# Patient Record
Sex: MALE | Race: BLACK OR AFRICAN AMERICAN | NOT HISPANIC OR LATINO | Employment: FULL TIME | URBAN - METROPOLITAN AREA
[De-identification: names, ages, dates, MRNs, and addresses within clinical notes are randomized per-mention and may not be internally consistent; named-entity substitution may affect disease eponyms.]

---

## 2017-01-10 ENCOUNTER — TREATMENT (OUTPATIENT)
Dept: PHYSICAL THERAPY | Facility: CLINIC | Age: 52
End: 2017-01-10

## 2017-01-10 PROCEDURE — 97750 PHYSICAL PERFORMANCE TEST: CPT | Performed by: PHYSICAL THERAPIST

## 2017-03-16 ENCOUNTER — APPOINTMENT (OUTPATIENT)
Dept: PREADMISSION TESTING | Facility: HOSPITAL | Age: 52
End: 2017-03-16

## 2017-03-16 VITALS — WEIGHT: 247.58 LBS | HEIGHT: 71 IN | BODY MASS INDEX: 34.66 KG/M2

## 2017-03-16 LAB
DEPRECATED RDW RBC AUTO: 39.8 FL (ref 37–54)
ERYTHROCYTE [DISTWIDTH] IN BLOOD BY AUTOMATED COUNT: 13.9 % (ref 11.3–14.5)
HBA1C MFR BLD: 6.5 % (ref 4.8–5.6)
HCT VFR BLD AUTO: 42.9 % (ref 38.9–50.9)
HGB BLD-MCNC: 14.3 G/DL (ref 13.1–17.5)
MCH RBC QN AUTO: 26.4 PG (ref 27–31)
MCHC RBC AUTO-ENTMCNC: 33.3 G/DL (ref 32–36)
MCV RBC AUTO: 79.2 FL (ref 80–99)
PLATELET # BLD AUTO: 287 10*3/MM3 (ref 150–450)
PMV BLD AUTO: 9.1 FL (ref 6–12)
POTASSIUM BLD-SCNC: 4.2 MMOL/L (ref 3.5–5.5)
RBC # BLD AUTO: 5.42 10*6/MM3 (ref 4.2–5.76)
WBC NRBC COR # BLD: 7.82 10*3/MM3 (ref 3.5–10.8)

## 2017-03-16 PROCEDURE — 85027 COMPLETE CBC AUTOMATED: CPT | Performed by: ANESTHESIOLOGY

## 2017-03-16 PROCEDURE — 93005 ELECTROCARDIOGRAM TRACING: CPT

## 2017-03-16 PROCEDURE — 93010 ELECTROCARDIOGRAM REPORT: CPT | Performed by: INTERNAL MEDICINE

## 2017-03-16 PROCEDURE — 84132 ASSAY OF SERUM POTASSIUM: CPT | Performed by: ANESTHESIOLOGY

## 2017-03-16 PROCEDURE — 83036 HEMOGLOBIN GLYCOSYLATED A1C: CPT | Performed by: ANESTHESIOLOGY

## 2017-03-16 PROCEDURE — 36415 COLL VENOUS BLD VENIPUNCTURE: CPT

## 2017-03-16 NOTE — PAT
MEASURED FOR TEDS/SCDS IN PAT    CALF MEASUREMENT   17.5   LENGTH MEASUREMENT 18    Patient to apply to surgical area (as instructed) the night before procedure and the AM of procedure.

## 2017-03-20 ENCOUNTER — ANESTHESIA EVENT (OUTPATIENT)
Dept: PERIOP | Facility: HOSPITAL | Age: 52
End: 2017-03-20

## 2017-03-20 RX ORDER — FAMOTIDINE 10 MG/ML
20 INJECTION, SOLUTION INTRAVENOUS ONCE
Status: CANCELLED | OUTPATIENT
Start: 2017-03-20 | End: 2017-03-20

## 2017-03-21 ENCOUNTER — ANESTHESIA (OUTPATIENT)
Dept: PERIOP | Facility: HOSPITAL | Age: 52
End: 2017-03-21

## 2017-03-21 ENCOUNTER — APPOINTMENT (OUTPATIENT)
Dept: GENERAL RADIOLOGY | Facility: HOSPITAL | Age: 52
End: 2017-03-21

## 2017-03-21 ENCOUNTER — HOSPITAL ENCOUNTER (INPATIENT)
Facility: HOSPITAL | Age: 52
LOS: 1 days | Discharge: HOME | End: 2017-03-22
Attending: ORTHOPAEDIC SURGERY | Admitting: ORTHOPAEDIC SURGERY

## 2017-03-21 DIAGNOSIS — M50.90 CERVICAL DISC DISORDER: ICD-10-CM

## 2017-03-21 PROBLEM — M54.2 NECK PAIN: Status: ACTIVE | Noted: 2017-03-21

## 2017-03-21 PROBLEM — E11.9 DIABETES MELLITUS: Status: ACTIVE | Noted: 2017-03-21

## 2017-03-21 PROBLEM — Z98.1 S/P CERVICAL SPINAL FUSION: Status: ACTIVE | Noted: 2017-03-21

## 2017-03-21 PROBLEM — I10 HTN (HYPERTENSION): Status: ACTIVE | Noted: 2017-03-21

## 2017-03-21 LAB
GLUCOSE BLDC GLUCOMTR-MCNC: 170 MG/DL (ref 70–130)
GLUCOSE BLDC GLUCOMTR-MCNC: 175 MG/DL (ref 70–130)

## 2017-03-21 PROCEDURE — 25010000002 HYDROMORPHONE PER 4 MG: Performed by: ANESTHESIOLOGY

## 2017-03-21 PROCEDURE — 82962 GLUCOSE BLOOD TEST: CPT

## 2017-03-21 PROCEDURE — C1713 ANCHOR/SCREW BN/BN,TIS/BN: HCPCS | Performed by: ORTHOPAEDIC SURGERY

## 2017-03-21 PROCEDURE — 88304 TISSUE EXAM BY PATHOLOGIST: CPT | Performed by: ORTHOPAEDIC SURGERY

## 2017-03-21 PROCEDURE — 25010000002 DEXAMETHASONE PER 1 MG: Performed by: NURSE ANESTHETIST, CERTIFIED REGISTERED

## 2017-03-21 PROCEDURE — 63710000001 INSULIN LISPRO (HUMAN) PER 5 UNITS: Performed by: NURSE PRACTITIONER

## 2017-03-21 PROCEDURE — 25010000002 ONDANSETRON PER 1 MG: Performed by: NURSE ANESTHETIST, CERTIFIED REGISTERED

## 2017-03-21 PROCEDURE — 76000 FLUOROSCOPY <1 HR PHYS/QHP: CPT

## 2017-03-21 PROCEDURE — 25010000002 FENTANYL CITRATE (PF) 100 MCG/2ML SOLUTION: Performed by: NURSE ANESTHETIST, CERTIFIED REGISTERED

## 2017-03-21 PROCEDURE — 76001 HC FLUORO OVER 1 HOUR: CPT

## 2017-03-21 PROCEDURE — 88311 DECALCIFY TISSUE: CPT | Performed by: ORTHOPAEDIC SURGERY

## 2017-03-21 PROCEDURE — 25010000003 CEFAZOLIN IN DEXTROSE 2-4 GM/100ML-% SOLUTION

## 2017-03-21 PROCEDURE — 25010000002 HYDROMORPHONE PER 4 MG: Performed by: ORTHOPAEDIC SURGERY

## 2017-03-21 PROCEDURE — 25010000002 MORPHINE PER 10 MG: Performed by: ANESTHESIOLOGY

## 2017-03-21 PROCEDURE — 0RG20A0 FUSION OF 2 OR MORE CERVICAL VERTEBRAL JOINTS WITH INTERBODY FUSION DEVICE, ANTERIOR APPROACH, ANTERIOR COLUMN, OPEN APPROACH: ICD-10-PCS | Performed by: ORTHOPAEDIC SURGERY

## 2017-03-21 PROCEDURE — L0120 CERV FLEX N/ADJ FOAM PRE OTS: HCPCS | Performed by: ORTHOPAEDIC SURGERY

## 2017-03-21 PROCEDURE — 25010000003 CEFAZOLIN IN DEXTROSE 2-4 GM/100ML-% SOLUTION: Performed by: ORTHOPAEDIC SURGERY

## 2017-03-21 PROCEDURE — 25010000002 PROPOFOL 10 MG/ML EMULSION: Performed by: NURSE ANESTHETIST, CERTIFIED REGISTERED

## 2017-03-21 PROCEDURE — 25010000002 PHENYLEPHRINE PER 1 ML: Performed by: NURSE ANESTHETIST, CERTIFIED REGISTERED

## 2017-03-21 PROCEDURE — 0RT30ZZ RESECTION OF CERVICAL VERTEBRAL DISC, OPEN APPROACH: ICD-10-PCS | Performed by: ORTHOPAEDIC SURGERY

## 2017-03-21 DEVICE — SCRW SKYLINE VAR SD 16MM: Type: IMPLANTABLE DEVICE | Site: SPINE CERVICAL | Status: FUNCTIONAL

## 2017-03-21 DEVICE — BENGAL SYSTEM LARGE IMPLANT 6MM, 7 DEGREES
Type: IMPLANTABLE DEVICE | Site: SPINE CERVICAL | Status: FUNCTIONAL
Brand: BENGAL

## 2017-03-21 DEVICE — ALLOGRFT BONE VIVIGEN CELLUAR MATRX FZ 1CC: Type: IMPLANTABLE DEVICE | Site: SPINE CERVICAL | Status: FUNCTIONAL

## 2017-03-21 DEVICE — PLT SKYLINE 2LVL 36MM: Type: IMPLANTABLE DEVICE | Site: SPINE CERVICAL | Status: FUNCTIONAL

## 2017-03-21 RX ORDER — FENTANYL CITRATE 50 UG/ML
50 INJECTION, SOLUTION INTRAMUSCULAR; INTRAVENOUS
Status: DISCONTINUED | OUTPATIENT
Start: 2017-03-21 | End: 2017-03-21 | Stop reason: HOSPADM

## 2017-03-21 RX ORDER — ACETAMINOPHEN 325 MG/1
650 TABLET ORAL EVERY 4 HOURS PRN
Status: DISCONTINUED | OUTPATIENT
Start: 2017-03-21 | End: 2017-03-22 | Stop reason: HOSPADM

## 2017-03-21 RX ORDER — SODIUM CHLORIDE 0.9 % (FLUSH) 0.9 %
1-10 SYRINGE (ML) INJECTION AS NEEDED
Status: DISCONTINUED | OUTPATIENT
Start: 2017-03-21 | End: 2017-03-21 | Stop reason: HOSPADM

## 2017-03-21 RX ORDER — HYDROMORPHONE HYDROCHLORIDE 1 MG/ML
0.5 INJECTION, SOLUTION INTRAMUSCULAR; INTRAVENOUS; SUBCUTANEOUS
Status: DISCONTINUED | OUTPATIENT
Start: 2017-03-21 | End: 2017-03-21 | Stop reason: HOSPADM

## 2017-03-21 RX ORDER — MAGNESIUM HYDROXIDE 1200 MG/15ML
LIQUID ORAL AS NEEDED
Status: DISCONTINUED | OUTPATIENT
Start: 2017-03-21 | End: 2017-03-21 | Stop reason: HOSPADM

## 2017-03-21 RX ORDER — LIDOCAINE HYDROCHLORIDE 10 MG/ML
INJECTION, SOLUTION EPIDURAL; INFILTRATION; INTRACAUDAL; PERINEURAL AS NEEDED
Status: DISCONTINUED | OUTPATIENT
Start: 2017-03-21 | End: 2017-03-21 | Stop reason: SURG

## 2017-03-21 RX ORDER — ZOLPIDEM TARTRATE 5 MG/1
5 TABLET ORAL NIGHTLY PRN
Status: DISCONTINUED | OUTPATIENT
Start: 2017-03-21 | End: 2017-03-22 | Stop reason: HOSPADM

## 2017-03-21 RX ORDER — ONDANSETRON 2 MG/ML
4 INJECTION INTRAMUSCULAR; INTRAVENOUS ONCE AS NEEDED
Status: DISCONTINUED | OUTPATIENT
Start: 2017-03-21 | End: 2017-03-21 | Stop reason: HOSPADM

## 2017-03-21 RX ORDER — DEXAMETHASONE SODIUM PHOSPHATE 4 MG/ML
4 INJECTION, SOLUTION INTRA-ARTICULAR; INTRALESIONAL; INTRAMUSCULAR; INTRAVENOUS; SOFT TISSUE EVERY 6 HOURS SCHEDULED
Status: DISCONTINUED | OUTPATIENT
Start: 2017-03-21 | End: 2017-03-22 | Stop reason: HOSPADM

## 2017-03-21 RX ORDER — AMLODIPINE BESYLATE 2.5 MG/1
2.5 TABLET ORAL DAILY
Status: DISCONTINUED | OUTPATIENT
Start: 2017-03-22 | End: 2017-03-22 | Stop reason: HOSPADM

## 2017-03-21 RX ORDER — SODIUM CHLORIDE, SODIUM LACTATE, POTASSIUM CHLORIDE, CALCIUM CHLORIDE 600; 310; 30; 20 MG/100ML; MG/100ML; MG/100ML; MG/100ML
9 INJECTION, SOLUTION INTRAVENOUS CONTINUOUS
Status: DISCONTINUED | OUTPATIENT
Start: 2017-03-21 | End: 2017-03-22 | Stop reason: HOSPADM

## 2017-03-21 RX ORDER — HYDROMORPHONE HCL 110MG/55ML
2 PATIENT CONTROLLED ANALGESIA SYRINGE INTRAVENOUS EVERY 4 HOURS PRN
Status: DISCONTINUED | OUTPATIENT
Start: 2017-03-21 | End: 2017-03-22 | Stop reason: HOSPADM

## 2017-03-21 RX ORDER — FENTANYL CITRATE 50 UG/ML
50 INJECTION, SOLUTION INTRAMUSCULAR; INTRAVENOUS
Status: DISCONTINUED | OUTPATIENT
Start: 2017-03-21 | End: 2017-03-22 | Stop reason: HOSPADM

## 2017-03-21 RX ORDER — DEXAMETHASONE 4 MG/1
4 TABLET ORAL EVERY 6 HOURS SCHEDULED
Status: DISCONTINUED | OUTPATIENT
Start: 2017-03-21 | End: 2017-03-22 | Stop reason: HOSPADM

## 2017-03-21 RX ORDER — ROCURONIUM BROMIDE 10 MG/ML
INJECTION, SOLUTION INTRAVENOUS AS NEEDED
Status: DISCONTINUED | OUTPATIENT
Start: 2017-03-21 | End: 2017-03-21 | Stop reason: SURG

## 2017-03-21 RX ORDER — ATRACURIUM BESYLATE 10 MG/ML
INJECTION, SOLUTION INTRAVENOUS AS NEEDED
Status: DISCONTINUED | OUTPATIENT
Start: 2017-03-21 | End: 2017-03-21 | Stop reason: SURG

## 2017-03-21 RX ORDER — PROPOFOL 10 MG/ML
VIAL (ML) INTRAVENOUS AS NEEDED
Status: DISCONTINUED | OUTPATIENT
Start: 2017-03-21 | End: 2017-03-21 | Stop reason: SURG

## 2017-03-21 RX ORDER — PROMETHAZINE HYDROCHLORIDE 25 MG/1
25 SUPPOSITORY RECTAL ONCE AS NEEDED
Status: DISCONTINUED | OUTPATIENT
Start: 2017-03-21 | End: 2017-03-21 | Stop reason: HOSPADM

## 2017-03-21 RX ORDER — FENTANYL CITRATE 50 UG/ML
INJECTION, SOLUTION INTRAMUSCULAR; INTRAVENOUS AS NEEDED
Status: DISCONTINUED | OUTPATIENT
Start: 2017-03-21 | End: 2017-03-21 | Stop reason: SURG

## 2017-03-21 RX ORDER — HYDROMORPHONE HYDROCHLORIDE 1 MG/ML
0.5 INJECTION, SOLUTION INTRAMUSCULAR; INTRAVENOUS; SUBCUTANEOUS
Status: DISCONTINUED | OUTPATIENT
Start: 2017-03-21 | End: 2017-03-22 | Stop reason: HOSPADM

## 2017-03-21 RX ORDER — ONDANSETRON 2 MG/ML
4 INJECTION INTRAMUSCULAR; INTRAVENOUS EVERY 6 HOURS PRN
Status: DISCONTINUED | OUTPATIENT
Start: 2017-03-21 | End: 2017-03-22 | Stop reason: HOSPADM

## 2017-03-21 RX ORDER — SODIUM CHLORIDE 0.9 % (FLUSH) 0.9 %
1-10 SYRINGE (ML) INJECTION AS NEEDED
Status: DISCONTINUED | OUTPATIENT
Start: 2017-03-21 | End: 2017-03-22 | Stop reason: HOSPADM

## 2017-03-21 RX ORDER — PROMETHAZINE HYDROCHLORIDE 25 MG/ML
12.5 INJECTION, SOLUTION INTRAMUSCULAR; INTRAVENOUS ONCE AS NEEDED
Status: DISCONTINUED | OUTPATIENT
Start: 2017-03-21 | End: 2017-03-21 | Stop reason: HOSPADM

## 2017-03-21 RX ORDER — ONDANSETRON 2 MG/ML
INJECTION INTRAMUSCULAR; INTRAVENOUS AS NEEDED
Status: DISCONTINUED | OUTPATIENT
Start: 2017-03-21 | End: 2017-03-21 | Stop reason: SURG

## 2017-03-21 RX ORDER — DEXTROSE MONOHYDRATE 25 G/50ML
25 INJECTION, SOLUTION INTRAVENOUS
Status: DISCONTINUED | OUTPATIENT
Start: 2017-03-21 | End: 2017-03-22 | Stop reason: HOSPADM

## 2017-03-21 RX ORDER — MORPHINE SULFATE 1 MG/ML
INJECTION, SOLUTION EPIDURAL; INTRATHECAL; INTRAVENOUS AS NEEDED
Status: DISCONTINUED | OUTPATIENT
Start: 2017-03-21 | End: 2017-03-21 | Stop reason: SURG

## 2017-03-21 RX ORDER — FAMOTIDINE 20 MG/1
20 TABLET, FILM COATED ORAL ONCE
Status: COMPLETED | OUTPATIENT
Start: 2017-03-21 | End: 2017-03-21

## 2017-03-21 RX ORDER — HYDRALAZINE HYDROCHLORIDE 20 MG/ML
10 INJECTION INTRAMUSCULAR; INTRAVENOUS EVERY 6 HOURS PRN
Status: DISCONTINUED | OUTPATIENT
Start: 2017-03-21 | End: 2017-03-22 | Stop reason: HOSPADM

## 2017-03-21 RX ORDER — CEFAZOLIN SODIUM 2 G/100ML
2 INJECTION, SOLUTION INTRAVENOUS ONCE
Status: COMPLETED | OUTPATIENT
Start: 2017-03-21 | End: 2017-03-21

## 2017-03-21 RX ORDER — NALOXONE HCL 0.4 MG/ML
0.4 VIAL (ML) INJECTION
Status: DISCONTINUED | OUTPATIENT
Start: 2017-03-21 | End: 2017-03-22 | Stop reason: HOSPADM

## 2017-03-21 RX ORDER — NICOTINE POLACRILEX 4 MG
15 LOZENGE BUCCAL
Status: DISCONTINUED | OUTPATIENT
Start: 2017-03-21 | End: 2017-03-22 | Stop reason: HOSPADM

## 2017-03-21 RX ORDER — BISACODYL 10 MG
10 SUPPOSITORY, RECTAL RECTAL DAILY PRN
Status: DISCONTINUED | OUTPATIENT
Start: 2017-03-21 | End: 2017-03-22 | Stop reason: HOSPADM

## 2017-03-21 RX ORDER — FAMOTIDINE 10 MG/ML
20 INJECTION, SOLUTION INTRAVENOUS EVERY 12 HOURS SCHEDULED
Status: DISCONTINUED | OUTPATIENT
Start: 2017-03-21 | End: 2017-03-22 | Stop reason: HOSPADM

## 2017-03-21 RX ORDER — PROMETHAZINE HYDROCHLORIDE 25 MG/1
25 TABLET ORAL ONCE AS NEEDED
Status: DISCONTINUED | OUTPATIENT
Start: 2017-03-21 | End: 2017-03-21 | Stop reason: HOSPADM

## 2017-03-21 RX ORDER — OXYCODONE AND ACETAMINOPHEN 7.5; 325 MG/1; MG/1
1 TABLET ORAL EVERY 4 HOURS PRN
Status: DISCONTINUED | OUTPATIENT
Start: 2017-03-21 | End: 2017-03-22

## 2017-03-21 RX ORDER — DEXAMETHASONE SODIUM PHOSPHATE 10 MG/ML
INJECTION INTRAMUSCULAR; INTRAVENOUS AS NEEDED
Status: DISCONTINUED | OUTPATIENT
Start: 2017-03-21 | End: 2017-03-21 | Stop reason: SURG

## 2017-03-21 RX ORDER — FAMOTIDINE 20 MG/1
20 TABLET, FILM COATED ORAL EVERY 12 HOURS SCHEDULED
Status: DISCONTINUED | OUTPATIENT
Start: 2017-03-21 | End: 2017-03-22 | Stop reason: HOSPADM

## 2017-03-21 RX ORDER — AMLODIPINE BESYLATE 2.5 MG/1
2.5 TABLET ORAL DAILY
COMMUNITY

## 2017-03-21 RX ORDER — SODIUM CHLORIDE AND POTASSIUM CHLORIDE 150; 900 MG/100ML; MG/100ML
100 INJECTION, SOLUTION INTRAVENOUS CONTINUOUS
Status: DISCONTINUED | OUTPATIENT
Start: 2017-03-21 | End: 2017-03-22 | Stop reason: HOSPADM

## 2017-03-21 RX ORDER — CEFAZOLIN SODIUM 2 G/100ML
2 INJECTION, SOLUTION INTRAVENOUS EVERY 8 HOURS
Status: COMPLETED | OUTPATIENT
Start: 2017-03-21 | End: 2017-03-22

## 2017-03-21 RX ORDER — LIDOCAINE HYDROCHLORIDE 10 MG/ML
1 INJECTION, SOLUTION EPIDURAL; INFILTRATION; INTRACAUDAL; PERINEURAL ONCE
Status: COMPLETED | OUTPATIENT
Start: 2017-03-21 | End: 2017-03-21

## 2017-03-21 RX ADMIN — EPHEDRINE SULFATE 10 MG: 50 INJECTION INTRAMUSCULAR; INTRAVENOUS; SUBCUTANEOUS at 13:56

## 2017-03-21 RX ADMIN — PHENYLEPHRINE HYDROCHLORIDE 100 MCG: 10 INJECTION INTRAVENOUS at 13:58

## 2017-03-21 RX ADMIN — SODIUM CHLORIDE, POTASSIUM CHLORIDE, SODIUM LACTATE AND CALCIUM CHLORIDE 9 ML/HR: 600; 310; 30; 20 INJECTION, SOLUTION INTRAVENOUS at 12:11

## 2017-03-21 RX ADMIN — HYDROMORPHONE HYDROCHLORIDE 0.5 MG: 1 INJECTION, SOLUTION INTRAMUSCULAR; INTRAVENOUS; SUBCUTANEOUS at 16:50

## 2017-03-21 RX ADMIN — LIDOCAINE HYDROCHLORIDE 0.4 ML: 10 INJECTION, SOLUTION EPIDURAL; INFILTRATION; INTRACAUDAL; PERINEURAL at 12:12

## 2017-03-21 RX ADMIN — PROPOFOL 200 MG: 10 INJECTION, EMULSION INTRAVENOUS at 13:22

## 2017-03-21 RX ADMIN — CEFAZOLIN SODIUM 2 G: 2 INJECTION, SOLUTION INTRAVENOUS at 13:16

## 2017-03-21 RX ADMIN — INSULIN LISPRO 2 UNITS: 100 INJECTION, SOLUTION INTRAVENOUS; SUBCUTANEOUS at 22:52

## 2017-03-21 RX ADMIN — PROPOFOL 70 MG: 10 INJECTION, EMULSION INTRAVENOUS at 14:38

## 2017-03-21 RX ADMIN — ONDANSETRON 4 MG: 2 INJECTION INTRAMUSCULAR; INTRAVENOUS at 15:44

## 2017-03-21 RX ADMIN — HYDROMORPHONE HYDROCHLORIDE 2 MG: 2 INJECTION, SOLUTION INTRAMUSCULAR; INTRAVENOUS; SUBCUTANEOUS at 17:45

## 2017-03-21 RX ADMIN — FENTANYL CITRATE 150 MCG: 50 INJECTION, SOLUTION INTRAMUSCULAR; INTRAVENOUS at 13:22

## 2017-03-21 RX ADMIN — OXYCODONE HYDROCHLORIDE AND ACETAMINOPHEN 1 TABLET: 7.5; 325 TABLET ORAL at 21:12

## 2017-03-21 RX ADMIN — MUPIROCIN: 20 OINTMENT TOPICAL at 12:11

## 2017-03-21 RX ADMIN — FENTANYL CITRATE 50 MCG: 50 INJECTION, SOLUTION INTRAMUSCULAR; INTRAVENOUS at 15:45

## 2017-03-21 RX ADMIN — FENTANYL CITRATE 50 MCG: 50 INJECTION, SOLUTION INTRAMUSCULAR; INTRAVENOUS at 13:45

## 2017-03-21 RX ADMIN — FAMOTIDINE 20 MG: 20 TABLET ORAL at 21:12

## 2017-03-21 RX ADMIN — SODIUM CHLORIDE, POTASSIUM CHLORIDE, SODIUM LACTATE AND CALCIUM CHLORIDE: 600; 310; 30; 20 INJECTION, SOLUTION INTRAVENOUS at 14:41

## 2017-03-21 RX ADMIN — MORPHINE SULFATE 5 MG: 1 INJECTION, SOLUTION EPIDURAL; INTRATHECAL; INTRAVENOUS at 16:15

## 2017-03-21 RX ADMIN — ROCURONIUM BROMIDE 15 MG: 10 INJECTION INTRAVENOUS at 14:38

## 2017-03-21 RX ADMIN — DEXAMETHASONE SODIUM PHOSPHATE 8 MG: 10 INJECTION INTRAMUSCULAR; INTRAVENOUS at 13:31

## 2017-03-21 RX ADMIN — ATRACURIUM BESYLATE 50 MG: 10 INJECTION, SOLUTION INTRAVENOUS at 13:22

## 2017-03-21 RX ADMIN — MORPHINE SULFATE 5 MG: 1 INJECTION, SOLUTION EPIDURAL; INTRATHECAL; INTRAVENOUS at 16:05

## 2017-03-21 RX ADMIN — LIDOCAINE HYDROCHLORIDE 50 MG: 10 INJECTION, SOLUTION EPIDURAL; INFILTRATION; INTRACAUDAL; PERINEURAL at 13:22

## 2017-03-21 RX ADMIN — EPHEDRINE SULFATE 10 MG: 50 INJECTION INTRAMUSCULAR; INTRAVENOUS; SUBCUTANEOUS at 14:20

## 2017-03-21 RX ADMIN — CEFAZOLIN SODIUM 2 G: 2 INJECTION, SOLUTION INTRAVENOUS at 21:12

## 2017-03-21 RX ADMIN — FAMOTIDINE 20 MG: 20 TABLET, FILM COATED ORAL at 12:11

## 2017-03-21 NOTE — CONSULTS
Patient Name: Kelton Escamilla  MRN: [id number]  : 1965  DOS: 3/21/2017    Attending: Shadi Mckenna MD    Primary Care Provider: Ángel Lopez MD      Chief complaint:  Neck pain S/P ACDF    Subjective :  Patient is a 51 y.o. male who presented to the hospital for a cervical discectomy with fusion with Dr. Freed related to increasing neck pain that radiated to the right arm with numbness to his right pinky. He was seen in PACU where he is drowsy but will respond appropriately.  He c/o neck pain 4/10 at this time with a cervical collar in place. He was found to be diabetic on his pre-op labs that is a new diagnosis for him.  He is otherwise healthy. He denies chest pain, shortness of breath, nausea, or vomiting.  He has no other complaints and is in no acute distress.   Seen later in his room, continues to do well. No f/c/n/vom/sob. Has some numbness in his hands. Pain from prior to surgery in right arm has improved. wy    Allergies:  No Known Allergies    Meds:  Prescriptions Prior to Admission   Medication Sig Dispense Refill Last Dose   • amLODIPine (NORVASC) 2.5 MG tablet Take 2.5 mg by mouth Daily.   3/21/2017 at 0900         History:   Past Medical History   Diagnosis Date   • Cervical stenosis of spine    • Generalized headaches    • Hypertension    • Wears eyeglasses      Past Surgical History   Procedure Laterality Date   • Shoulder surgery Right      times 4     History reviewed. No pertinent family history.  Social History   Substance Use Topics   • Smoking status: Never Smoker   • Smokeless tobacco: Current User     Types: Chew   • Alcohol use Yes      Comment: rare   Lives at home with his wife. 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     Review of Systems  Pertinent items are noted in HPI, all other systems reviewed and negative    Vital Signs  Visit Vitals   • /88 (BP Location: Right arm, Patient Position: Lying)   • Pulse 71   • Temp 98.2 °F (36.8 °C) (Temporal Artery )   • Resp 20   •  "Ht 70.5\" (179.1 cm)   • Wt 247 lb (112 kg)   • SpO2 96%   • BMI 34.94 kg/m2       Physical Exam:    General Appearance:    Alert, cooperative, in no acute distress   Neck:   Cervical collar in place with SHYLA drain and dressing under     collar. No active bleeding or hematoma.    Back:     No kyphosis present, no scoliosis present,range of motion   normal   Lungs:     Clear to auscultation,respirations regular, even and                   unlabored    Heart:    Regular rhythm and normal rate, normal S1 and S2, no            murmur, no gallop, no rub, no click   Abdomen:     Normal bowel sounds, no masses, no organomegaly, soft        non-tender, non-distended, no guarding, no rebound                 tenderness   Genitalia:    Deferred   Extremities:   Moves all extremities well, no edema, no cyanosis, no              redness   Pulses:   Pulses palpable and equal bilaterally   Skin:   No bleeding, bruising or rash   Neurologic:   Cranial nerves 2 - 12 grossly intact, sensation intact, DTR        present and equal bilaterally      I reviewed the patient's new clinical results.     Results from last 7 days  Lab Units 03/16/17  0946   WBC 10*3/mm3 7.82   HEMOGLOBIN g/dL 14.3   HEMATOCRIT % 42.9   PLATELETS 10*3/mm3 287       Results from last 7 days  Lab Units 03/16/17  0946   POTASSIUM mmol/L 4.2     Lab Results   Component Value Date    HGBA1C 6.50 (H) 03/16/2017     Assessment and Plan:     Principal Problem:    S/P cervical spinal fusion- ACDF  Active Problems:    Acute post-op pain    Cervical stenosis    HTN (hypertension)    Diabetes II- New diagnosis    Plan  1. PT/OT. Ambulation encouraged.   2. Pain control-prns   3. IS-encouraged  4. DVT proph- [facility]  5. Bowel regimen  6. Resume home medications as appropriate  7. Monitor post-op labs  8. DC planning for home possibly tomorrow.  9.  Monitor BG with SSI. Diabetic education.    Aidan Antoine, APRN  03/21/17  4:19 PM   Seen and examined by me. Agree with above. " Discussed with patient and wife.

## 2017-03-21 NOTE — H&P
"  Patient Care Team:      Chief complaint: Right sided neck pain that radiates tot he right arm and hand.  Subjective:    Patient is a 51 y.o.male presents with increasing neck pain that radiates to the right arm that is associated with numbness in his pinky. Pain is persistent. He has a previous h/o multiple shoulder surgeries.    Review of Systems:  General ROS: negative for fever, chills, malaise    Cardiovascular ROS: no chest pain or dyspnea on exertion  Respiratory ROS: no cough, shortness of breath, or wheezing      Allergies: No Known Allergies       Latex:No  Contrast Dye: No    Home Meds    Prescriptions Prior to Admission   Medication Sig Dispense Refill Last Dose   • amLODIPine (NORVASC) 2.5 MG tablet Take 2.5 mg by mouth Daily.   3/21/2017 at 0900     PMH:   Past Medical History   Diagnosis Date   • Cervical stenosis of spine    • Generalized headaches    • Hypertension    • Wears eyeglasses      PSH:    Past Surgical History   Procedure Laterality Date   • Shoulder surgery Right      times 4     Immunization History: pneumo: No  Flu: No  Tetanus: Unknown    Social History:   Tobacco: Smokeless tobacoo   Alcohol: No      Physical Exam:  Visit Vitals   • /88 (BP Location: Right arm, Patient Position: Lying)   • Pulse 71   • Temp 98.2 °F (36.8 °C) (Temporal Artery )   • Resp 20   • Ht 70.5\" (179.1 cm)   • Wt 247 lb (112 kg)   • SpO2 96%   • BMI 34.94 kg/m2         General Appearance:    Alert, cooperative, no distress, appears stated age   Head:    Normocephalic, without obvious abnormality, atraumatic   Lungs:     Clear to auscultation bilaterally, respirations unlabored    Heart: Regular rate and rhythm, S1 and S2 normal, no murmur, rub    or gallop    Abdomen:    Soft without tenderness   Breast Exam:    deferred   Genitalia:    deferred   Extremities:   Extremities normal, atraumatic, no cyanosis or edema   Skin:   Skin color, texture, turgor normal, no rashes or lesions   Neurologic:   Grossly " intact     Results Review: Labs were reviewed    Impression: Cervical stenosis    Plan: Anterior Cervical discectomy with fusion    BRAYDEN Mendes 3/21/2017 12:50 PM

## 2017-03-22 VITALS
BODY MASS INDEX: 34.58 KG/M2 | WEIGHT: 247 LBS | RESPIRATION RATE: 16 BRPM | HEIGHT: 71 IN | SYSTOLIC BLOOD PRESSURE: 136 MMHG | TEMPERATURE: 97.8 F | HEART RATE: 84 BPM | OXYGEN SATURATION: 98 % | DIASTOLIC BLOOD PRESSURE: 79 MMHG

## 2017-03-22 LAB
ANION GAP SERPL CALCULATED.3IONS-SCNC: 10 MMOL/L (ref 3–11)
BUN BLD-MCNC: 14 MG/DL (ref 9–23)
BUN/CREAT SERPL: 14 (ref 7–25)
CALCIUM SPEC-SCNC: 9.7 MG/DL (ref 8.7–10.4)
CHLORIDE SERPL-SCNC: 100 MMOL/L (ref 99–109)
CO2 SERPL-SCNC: 25 MMOL/L (ref 20–31)
CREAT BLD-MCNC: 1 MG/DL (ref 0.6–1.3)
DEPRECATED RDW RBC AUTO: 41.6 FL (ref 37–54)
ERYTHROCYTE [DISTWIDTH] IN BLOOD BY AUTOMATED COUNT: 14.3 % (ref 11.3–14.5)
GFR SERPL CREATININE-BSD FRML MDRD: 96 ML/MIN/1.73
GLUCOSE BLD-MCNC: 181 MG/DL (ref 70–100)
GLUCOSE BLDC GLUCOMTR-MCNC: 170 MG/DL (ref 70–130)
HCT VFR BLD AUTO: 39.1 % (ref 38.9–50.9)
HGB BLD-MCNC: 12.7 G/DL (ref 13.1–17.5)
MCH RBC QN AUTO: 25.9 PG (ref 27–31)
MCHC RBC AUTO-ENTMCNC: 32.5 G/DL (ref 32–36)
MCV RBC AUTO: 79.6 FL (ref 80–99)
PLATELET # BLD AUTO: 273 10*3/MM3 (ref 150–450)
PMV BLD AUTO: 9.1 FL (ref 6–12)
POTASSIUM BLD-SCNC: 4.3 MMOL/L (ref 3.5–5.5)
RBC # BLD AUTO: 4.91 10*6/MM3 (ref 4.2–5.76)
SODIUM BLD-SCNC: 135 MMOL/L (ref 132–146)
WBC NRBC COR # BLD: 13.71 10*3/MM3 (ref 3.5–10.8)

## 2017-03-22 PROCEDURE — 85027 COMPLETE CBC AUTOMATED: CPT | Performed by: NURSE PRACTITIONER

## 2017-03-22 PROCEDURE — 25010000003 CEFAZOLIN IN DEXTROSE 2-4 GM/100ML-% SOLUTION: Performed by: ORTHOPAEDIC SURGERY

## 2017-03-22 PROCEDURE — 63710000001 INSULIN LISPRO (HUMAN) PER 5 UNITS: Performed by: NURSE PRACTITIONER

## 2017-03-22 PROCEDURE — 63710000001 DEXAMETHASONE PER 0.25 MG: Performed by: ORTHOPAEDIC SURGERY

## 2017-03-22 PROCEDURE — 25010000002 DEXAMETHASONE PER 1 MG: Performed by: ORTHOPAEDIC SURGERY

## 2017-03-22 PROCEDURE — 25010000002 HYDROMORPHONE PER 4 MG: Performed by: ORTHOPAEDIC SURGERY

## 2017-03-22 PROCEDURE — G0108 DIAB MANAGE TRN  PER INDIV: HCPCS | Performed by: REGISTERED NURSE

## 2017-03-22 PROCEDURE — 80048 BASIC METABOLIC PNL TOTAL CA: CPT | Performed by: NURSE PRACTITIONER

## 2017-03-22 PROCEDURE — 82962 GLUCOSE BLOOD TEST: CPT

## 2017-03-22 RX ORDER — OXYCODONE AND ACETAMINOPHEN 10; 325 MG/1; MG/1
1 TABLET ORAL EVERY 4 HOURS PRN
Status: DISCONTINUED | OUTPATIENT
Start: 2017-03-22 | End: 2017-03-22 | Stop reason: HOSPADM

## 2017-03-22 RX ORDER — OXYCODONE AND ACETAMINOPHEN 10; 325 MG/1; MG/1
1 TABLET ORAL EVERY 4 HOURS PRN
Qty: 40 TABLET | Refills: 0 | Status: SHIPPED | OUTPATIENT
Start: 2017-03-22 | End: 2017-04-01

## 2017-03-22 RX ADMIN — DEXAMETHASONE SODIUM PHOSPHATE 4 MG: 4 INJECTION, SOLUTION INTRA-ARTICULAR; INTRALESIONAL; INTRAMUSCULAR; INTRAVENOUS; SOFT TISSUE at 06:16

## 2017-03-22 RX ADMIN — HYDROMORPHONE HYDROCHLORIDE 2 MG: 2 INJECTION, SOLUTION INTRAMUSCULAR; INTRAVENOUS; SUBCUTANEOUS at 00:13

## 2017-03-22 RX ADMIN — DEXAMETHASONE SODIUM PHOSPHATE 4 MG: 4 INJECTION, SOLUTION INTRA-ARTICULAR; INTRALESIONAL; INTRAMUSCULAR; INTRAVENOUS; SOFT TISSUE at 00:06

## 2017-03-22 RX ADMIN — INSULIN LISPRO 2 UNITS: 100 INJECTION, SOLUTION INTRAVENOUS; SUBCUTANEOUS at 07:30

## 2017-03-22 RX ADMIN — AMLODIPINE BESYLATE 2.5 MG: 2.5 TABLET ORAL at 10:06

## 2017-03-22 RX ADMIN — CEFAZOLIN SODIUM 2 G: 2 INJECTION, SOLUTION INTRAVENOUS at 06:15

## 2017-03-22 RX ADMIN — OXYCODONE HYDROCHLORIDE AND ACETAMINOPHEN 1 TABLET: 10; 325 TABLET ORAL at 09:41

## 2017-03-22 RX ADMIN — OXYCODONE HYDROCHLORIDE AND ACETAMINOPHEN 1 TABLET: 7.5; 325 TABLET ORAL at 05:28

## 2017-03-22 NOTE — DISCHARGE SUMMARY
Patient Name: Kelton Escamilla  MRN: [id number]  : 1965  DOS: 3/22/2017    Attending: Kathleen Brewster MD    Primary Care Provider: Ángel Lopez MD    Date of Admission:.3/21/2017 11:12 AM    Date of Discharge:  3/22/2017    Discharge Diagnosis: Principal Problem:    S/P cervical spinal fusion- ACDF  Active Problems:    Neck pain    HTN (hypertension)    Diabetes mellitus- new diagnosis    Hospital Course  Patient is a 51 y.o. male who presented to the hospital on 3/21/2017 for an ACDF with Dr. Freed. The patient was provided pain medications as needed for pain control.  He was seen by OT and has progressed well over his stay. He used an IS for atelectasis prophylaxis and mechanicals for DVT prophylaxis. Home medications were resumed as appropriate, and labs were monitored and remained fairly stable. His Hgb A1C was elevated on her pre-op labs.   A diabetes educator provided him with diabetic education and a glucometer.  With the progress he has made, he is ready for DC home today. I discussed with patient regarding plan and he shows understanding and agreement. He is ready for discharge home today.    Procedures Performed  Procedure(s):  CERVICAL DISCECTOMY ANTERIOR WITH FUSION WITH INSTRUMENTATION     Pertinent Test Results:    I reviewed the patient's new clinical results.     Results from last 7 days  Lab Units 17  0644 17  0946   WBC 10*3/mm3 13.71* 7.82   HEMOGLOBIN g/dL 12.7* 14.3   HEMATOCRIT % 39.1 42.9   PLATELETS 10*3/mm3 273 287       Results from last 7 days  Lab Units 17  0644 17  0946   SODIUM mmol/L 135  --    POTASSIUM mmol/L 4.3 4.2   CHLORIDE mmol/L 100  --    TOTAL CO2 mmol/L 25.0  --    BUN mg/dL 14  --    CREATININE mg/dL 1.00  --    CALCIUM mg/dL 9.7  --    GLUCOSE mg/dL 181*  --      I reviewed the patient's new imaging including images and reports.    Discharge Assessment:    Vital Signs  /79  Pulse 84  Temp 97.8 °F (36.6 °C) (Oral)    "Resp 16  Ht 70.5\" (179.1 cm)  Wt 247 lb (112 kg)  SpO2 98%RA  BMI 34.94 kg/m2  Temp (24hrs), Av.3 °F (36.8 °C), Min:97.8 °F (36.6 °C), Max:98.5 °F (36.9 °C)      General Appearance:    Alert, cooperative, in no acute distress   Neck:   Cervical collar in place and dressing under collar. No active   bleeding or hematoma   Lungs:     Clear to auscultation,respirations regular, even and                   unlabored    Heart:    Regular rhythm and normal rate, normal S1 and S2, no            murmur, no gallop, no rub, no click   Abdomen:     Normal bowel sounds, no masses, no organomegaly, soft        non-tender, non-distended, no guarding, no rebound                 tenderness   Extremities:   Moves all extremities well, no edema, no cyanosis, no              redness   Pulses:   Pulses palpable and equal bilaterally   Skin:   No bleeding, bruising or rash   Neurologic:   Cranial nerves 2 - 12 grossly intact, sensation intact, DTR        present and equal bilaterally       Discharge Disposition:  Home    Discharge Medications   Kelton Escamilla   Home Medication Instructions MIRIAM:[id number]    Printed on:17 0910   Medication Information                      amLODIPine (NORVASC) 2.5 MG tablet  Take 2.5 mg by mouth Daily.             oxyCODONE-acetaminophen (PERCOCET)  MG per tablet  Take 1 tablet by mouth Every 4 (Four) Hours As Needed for Severe Pain (7-10) for up to 10 days.                 Discharge Diet: Regular Diabetic    Activity at Discharge: Per Dr. Freed.      Follow-up Appointments  Dr. Freed in 4 weeks  PCP in 2-4 weeks for new diagnosis of DM    This discharge took less than 30 minutes.        BRAYDEN Flaherty  17  9:51 AM  Seen and examined by me. Agree with above. Discussed with patient and wife.         "

## 2017-03-22 NOTE — PROGRESS NOTES
Discharge Planning Assessment  [facility]     Patient Name: Kelton Escamilla  MRN: [id number]  Today's Date: 3/22/2017    Admit Date: 3/21/2017          Discharge Needs Assessment       03/22/17 1051    Living Environment    Lives With spouse;child(mara), dependent    Living Arrangements house    Provides Primary Care For child(mara)    Quality Of Family Relationships supportive    Able to Return to Prior Living Arrangements yes    Discharge Needs Assessment    Concerns To Be Addressed no discharge needs identified;denies needs/concerns at this time    Equipment Currently Used at Home none    Equipment Needed After Discharge none    Transportation Available car;family or friend will provide            Discharge Plan       03/22/17 1052    Case Management/Social Work Plan    Plan Home    Patient/Family In Agreement With Plan yes    Additional Comments Spoke with patient and wife at bedside. They live with their two small children in a two story house in [facility]. He is independent with ADL's and mobility. Denies DME, HH, and outpatient services. No needs identified at this time. Family will transport at [place]. Goal is to return home. CM will follow.         Discharge Placement     No information found        Expected Discharge Date and Time     Expected Discharge Date Expected Discharge Time    Mar 22, 2017               Demographic Summary       03/22/17 1050    Referral Information    Arrived From home or self-care    Reason For Consult discharge planning    Primary Care Physician Information    Name Ángel Garciajanet            Functional Status       03/22/17 1051    Functional Status Prior    Ambulation 0-->independent    Transferring 0-->independent    Toileting 0-->independent    Bathing 0-->independent    Dressing 0-->independent    Eating 0-->independent    Communication 0-->understands/communicates without difficulty    Swallowing 0-->swallows foods/liquids without difficulty    Activity Tolerance    Usual  Activity Tolerance good    Current Activity Tolerance moderate            Psychosocial     None            Abuse/Neglect     None            Legal     None            Substance Abuse     None            Patient Forms     None          Siomara Membreno RN

## 2017-03-22 NOTE — OP NOTE
DATE OF PROCEDURE:  03/21/2017    PREOPERATIVE DIAGNOSIS: C5-C6 and C6-C7 cervical stenosis with radiculopathy.     POSTOPERATIVE DIAGNOSIS: C5-C6 and C6-C7 cervical stenosis with radiculopathy.     PROCEDURES PERFORMED:  1. Anterior cervical diskectomy and osteophytectomy, C5-C6 and C6-C7 to decompress neural elements.   2. Anterior interbody fusion, C5-C6 and C6-C7, with DePuy interbody fusion cage and bone graft.   3. Anterior cervical plating, C5-C6-C7 with DePuy Grantley plate.   4. Harvesting of bone graft locally from vertebral bodies and osteophytes.   5. Use of allograft bone (ViviGen).     SURGEON: Shadi Mckenna MD     ASSISTANT: EFRAIN Keith     ANESTHESIA: General.     DESCRIPTION OF PROCEDURE: The patient was given a preoperative dose of Ancef. He was given a general anesthetic. He was placed in the supine position. The neck was prepped and draped sterilely. A right-sided transverse incision was made. The platysma was split. The medial border of the SCM was identified. This was followed down to the prevertebral fascia. The longus colli muscle was elevated. By C-arm we identified levels. I exposed C5-C6 and C6-C7. Self-retaining retractors were placed below the longus colli muscle, the anesthesiologist was asked to deflate the endotracheal cuff and reinflate with the smallest amount of pressure possible. I started at C6-C7. There were large anterior osteophytes. These were excised using Leksell, Kerrisons and a bur. The disk space was identified, it was degenerate and narrowed. A complete diskectomy was performed using pituitaries and curettes. Using a high speed bur the posterior osteophytes were excised to decompress the neural elements. The neural elements at C6-C7 appeared decompressed.     I moved cephalad to C5-C6. A similar decompression was performed. Large anterior osteophytes were excised. A complete diskectomy was performed using pituitaries and curettes. Compressing osteophytes were  excised with the high-speed selina.     Bone graft was prepared. The osteophytes that were excised were mixed with some bone harvested from the vertebral bodies. This was morselized. It was mixed with 1 pack of allograft bone (ViviGen). This was the fusion material used.     Interbody fusion was performed. I started at C6-C7. An appropriate sized cage was size 6 large. A DePuy interbody fusion cage was selected. It was packed with bone graft. The endplates at C6-C7 were lightly decorticated. The disk space was modestly distracted and the cage was impacted in place and it locked into place well.     I moved cephalad to C5-C6. A similar interbody fusion was performed. A size 6 large DePuy interbody fusion cage was packed with bone graft. The disk space was modestly distracted. The cage was impacted at C5-C6 and it locked into place well and modestly distracted the disk space.     Next, anterior cervical plating was performed. The plate used was a DePuy skyline plate. It was fixed with 2 bone screws in C5, 2 bone screws in C6, and 2 bone screws in C7. The tightening cams were tightened to lock the screw to the plate.     Final C-arm images showed all hardware and interbody fusion cages in good position. The wound was copiously irrigated. A deep Hemovac drain was placed. The wound was closed in layers using Vicryl. A sterile dressing was applied. The patient was awakened and transported to the recovery room in stable condition.       MD MAIN Dasilva/vani  DD: 03/21/2017 15:59:47  DT: 03/21/2017 23:31:58  Voice Rec. ID #[id number]  Voice Original ID #[id number]  Doc ID #[id number]  Rev. #0  cc:

## 2017-03-22 NOTE — CONSULTS
Adult Nutrition  Assessment/PES    Patient Name:  Kelton Escamilla  YOB: 1965  MRN: [id number]  Admit Date:  3/21/2017    Assessment Date:  3/22/2017        Reason for Assessment       03/22/17 1221    Reason for Assessment    Reason For Assessment/Visit education    Time Spent (min) 10    Cardiac HTN    Endocrine DM   new dx    Substance Use ETOH;Tobacco                              Problem/Interventions:        Problem 1       03/22/17 1222    Nutrition Diagnoses Problem 1    Problem 1 Knowledge Deficit    Endocrine DM   new    Signs/Symptoms (evidenced by) Potential Information Deficit                    Intervention Goal       03/22/17 1223    Intervention Goal    General Provide information regarding MNT for treatment/condition                Education/Evaluation       03/22/17 1223    Education    Education Other (comment)   pt discharged prior to education.     Monitor/Evaluation    Education Follow-up Other (comment)   materials mailed.        Comments:      Electronically signed by:  Bailey Fiore RD  03/22/17 12:24 PM

## 2017-03-22 NOTE — PAYOR COMM NOTE
"Kelton Hart (51 y.o. Male) Discharge notification  1965    Date of Birth Social Security Number Address Home Phone MRN    1965  [address] [place] [address] [address] [place] [address] [phone] [id number]    Hinduism Marital Status          None        Admission Date Admission Type Admitting Provider Attending Provider Department, Room/Bed    3/21/17 Elective Shadi Mckenna MD  [facility] 5E, S529/1    Discharge Date Discharge Disposition Discharge Destination        3/22/2017 Home or Self Care             Attending Provider: (none)    Allergies:  No Known Allergies    Isolation:  None   Infection:  None   Code Status:  FULL    Ht:  70.5\" (179.1 cm)   Wt:  247 lb (112 kg)    Admission Cmt:  None   Principal Problem:  S/P cervical spinal fusion- ACDF [Z98.1]                 Active Insurance as of 3/21/2017     Primary Coverage     Payor Plan Insurance Group Employer/Plan Group    ANTHEM BLUE CROSS ANTHEM BLUE CROSS BLUE SHIELD PPO [id number]     Payor Plan Address Payor Plan Phone Number Effective From Effective To    [address] [phone] [address]     [place] [address]       Subscriber Name Subscriber Birth Date Member ID       KELTON HART 1965 [id number]           Secondary Coverage     Payor Plan Insurance Group Employer/Plan Group    HUMANA MEDICAID HU[employer]NA [employer] [id number]     Payor Plan Address Payor Plan Phone Number Effective From Effective To    PO  [phone] 3/21/2017     [place] [address]       Subscriber Name Subscriber Birth Date Member ID       KELTON HART 1965 [id number]                 Emergency Contacts      (Rel.) Home Phone Work Phone Mobile Phone    Catherine Hart (Spouse) [phone] -- --               Discharge Summary      BRAYDEN Flaherty at 3/22/2017  9:51 AM          Patient Name: Kelton Hart  MRN: [id number]  : 1965  DOS: 3/22/2017    Attending: Kathleen Brewster MD    Primary Care " "Provider: Ángel Lopez MD    Date of Admission:.3/21/2017 11:12 AM    Date of Discharge:  3/22/2017    Discharge Diagnosis: Principal Problem:    S/P cervical spinal fusion- ACDF  Active Problems:    Neck pain    HTN (hypertension)    Diabetes mellitus- new diagnosis    Hospital Course  Patient is a 51 y.o. male who presented to the hospital on 3/21/2017 for an ACDF with Dr. Freed. The patient was provided pain medications as needed for pain control.  He was seen by OT and has progressed well over his stay. He used an IS for atelectasis prophylaxis and mechanicals for DVT prophylaxis. Home medications were resumed as appropriate, and labs were monitored and remained fairly stable. His Hgb A1C was elevated on her pre-op labs. A diabetes educator provided him with diabetic education and a glucometer.With the progress he has made, he is ready for DC home today. I discussed with patient regarding plan and he shows understanding and agreement. He is ready for discharge home today.    Procedures Performed  Procedure(s):  CERVICAL DISCECTOMY ANTERIOR WITH FUSION WITH INSTRUMENTATION     Pertinent Test Results:    I reviewed the patient's new clinical results.     Results from last 7 days  Lab Units 17  0644 17  0946   WBC 10*3/mm3 13.71* 7.82   HEMOGLOBIN g/dL 12.7* 14.3   HEMATOCRIT % 39.1 42.9   PLATELETS 10*3/mm3 273 287       Results from last 7 days  Lab Units 17  0644 17  0946   SODIUM mmol/L 135  --    POTASSIUM mmol/L 4.3 4.2   CHLORIDE mmol/L 100  --    TOTAL CO2 mmol/L 25.0  --    BUN mg/dL 14  --    CREATININE mg/dL 1.00  --    CALCIUM mg/dL 9.7  --    GLUCOSE mg/dL 181*  --      I reviewed the patient's new imaging including images and reports.    Discharge Assessment:    Vital Signs  /79  Pulse 84  Temp 97.8 °F (36.6 °C) (Oral)   Resp 16  Ht 70.5\" (179.1 cm)  Wt 247 lb (112 kg)  SpO2 98%RA  BMI 34.94 kg/m2  Temp (24hrs), Av.3 °F (36.8 °C), Min:97.8 °F " (36.6 °C), Max:98.5 °F (36.9 °C)      General Appearance:    Alert, cooperative, in no acute distress   Neck:   Cervical collar in place and dressing under collar. No active   bleeding or hematoma   Lungs:     Clear to auscultation,respirations regular, even and                   unlabored    Heart:    Regular rhythm and normal rate, normal S1 and S2, no            murmur, no gallop, no rub, no click   Abdomen:     Normal bowel sounds, no masses, no organomegaly, soft        non-tender, non-distended, no guarding, no rebound                 tenderness   Extremities:   Moves all extremities well, no edema, no cyanosis, no              redness   Pulses:   Pulses palpable and equal bilaterally   Skin:   No bleeding, bruising or rash   Neurologic:   Cranial nerves 2 - 12 grossly intact, sensation intact, DTR        present and equal bilaterally       Discharge Disposition:  Home    Discharge Medications   Kelton Escmailla   Home Medication Instructions MIRIAM:[id number]    Printed on:03/22/17 0951   Medication Information                      amLODIPine (NORVASC) 2.5 MG tablet  Take 2.5 mg by mouth Daily.             oxyCODONE-acetaminophen (PERCOCET)  MG per tablet  Take 1 tablet by mouth Every 4 (Four) Hours As Needed for Severe Pain (7-10) for up to 10 days.                 Discharge Diet: Regular Diabetic    Activity at Discharge: Per Dr. Freed.      Follow-up Appointments  Dr. Freed in 4 weeks  PCP in 2-4 weeks for new diagnosis of DM\    This discharge took less than 30 minutes.        BRAYDEN Flaherty  03/22/17  9:51 AM             Electronically signed by BRAYDEN Flaherty at 3/22/2017  9:56 AM        Discharge Order     Start     Ordered    03/22/17 0958  Discharge patient  Once     Expected Discharge Date:  03/22/17    Discharge Disposition:  Home or Self Care        03/22/17 0957    03/22/17 0952  Discharge patient  Once     Expected Discharge Date:  03/22/17    Discharge Disposition:  Home or Self  Care        03/22/17 0951

## 2017-03-22 NOTE — CONSULTS
"Diabetes Education  Assessment/Teaching    Patient Name:  Kelton Escamilla  YOB: 1965  MRN: [id number]  Admit Date:  3/21/2017      Assessment Date:  3/22/2017       Most Recent Value    General Information      Referral From:  A1c, Blood glucose, MD order    Height  5' 10.5\" (1.791 m)    Height Method  Stated    Weight  247 lb (112 kg)    Weight Method  -- [confirmed with pt]    Pregnancy Assessment     Diabetes History     What type of diabetes do you have?  Type 2    Length of Diabetes Diagnosis  Newly diagnosed <6 months    Current DM knowledge  fair    Have you had diabetes education/teaching in the past?  no    Do you test your blood sugar at home?  no    Education Preferences     Nutrition Information     Assessment Topics     Healthy Eating - Assessment  Needs education    Being Active - Assessment  Needs education    Taking Medication - Assessment  N/A-unable to assess    Problem Solving - Assessment  Needs education    Reducing Risk - Assessment  Needs education    Healthy Coping - Assessment  Needs education    Monitoring - Assessment  Needs education    DM Goals                Most Recent Value    DM Education Needs     Meter  Meter provided    Meter Type  Contour    Frequency of Testing  Daily [urged to check before a meal with the 10 sample strips provided and f/u with PCP. Urged to f/u ASAP if >150 ac ]    Blood Glucose Target  -- [provided ADA recommended glucose target goals handout]    Reducing Risks  A1C testing, Blood pressure, Lipids    Physical Activity  -- [discussed need for PA to reduce insulin resistance. Instructed CDC guidelines of >150 min/wk and 5-7% weight loss]    Healthy Coping  Appropriate    Discharge Plan  Home    Motivation  Engaged, Strong    Teaching Method  Explanation, Discussion, Demonstration, Handouts, Teach back [wife also participated]    Patient Response  Verbalized understanding, Demonstrates adequately            Other Comments:  Patient educated on " Pre-diabetes, diabetes and the disease process, types of DM, diagnosis/A1C, monitoring, signs and symptoms, activity and exercise and how to prevent disease from progressing. Changing behavior and goal setting relative to diet, exercise and healthy lifestyle was emphasized. Patient provided a new donated Contour glucose meter and taught how to use it. Pt. was also advised to contact PCP for prescription for lancets and strips. Pt. verbalized understanding and also completed a return demonstration on using the meter using Teach Back method. Pt advised to consult with PCP for further instructions, discuss A1C result, and POC. Education handouts provided, questions answered and pt. advised to call with any other questions or concerns.        Electronically signed by:  Wendy Hudson RN  03/22/17 2:22 PM

## 2017-03-22 NOTE — PAYOR COMM NOTE
"Kelton Escamilla (51 y.o. Male)     INITIAL NOTIFICATION AND CLINICALS      Date of Birth Social Security Number Address Home Phone MRN    1965  [facility] [address] [address] [phone] [id number]    Restorationist Marital Status          None        Admission Date Admission Type Admitting Provider Attending Provider Department, Room/Bed    3/21/17 Elective Shadi Mckenna MD Vaughan, John J, MD [facility] 5E, S529/1    Discharge Date Discharge Disposition Discharge Destination                      Attending Provider: Shadi Mckenna MD     Allergies:  No Known Allergies    Isolation:  None   Infection:  None   Code Status:  FULL    Ht:  70.5\" (179.1 cm)   Wt:  247 lb (112 kg)    Admission Cmt:  None   Principal Problem:  S/P cervical spinal fusion- ACDF [Z98.1]                 Active Insurance as of 3/21/2017     Primary Coverage     Payor Plan Insurance Group Employer/Plan Group    ANTHEM BLUE CROSS ANTHEM BLUE CROSS BLUE SHIELD PPO [id number]     Payor Plan Address Payor Plan Phone Number Effective From Effective To    [address] [phone] 1/1/2016     [place] [address]       Subscriber Name Subscriber Birth Date Member ID       KELTON ESCAMILLA 1965 [id number]           Secondary Coverage     Payor Plan Insurance Group Employer/Plan Group    HUMANA MEDICAID HUMANA [employer] [facility]     Payor Plan Address Payor Plan Phone Number Effective From Effective To    PO  [phone] 3/21/2017     [place] [address]       Subscriber Name Subscriber Birth Date Member ID       KELTON ESCAMILLA 1965 [id number]                 Emergency Contacts      (Rel.) Home Phone Work Phone Mobile Phone    Catherine Escamilla (Spouse) [phone] -- --            Insurance Information                ANTHEM BLUE CROSS/ANTHEM BLUE CROSS BLUE SHIELD PPO Phone: [phone]    Subscriber: Kelton Escamilla Subscriber#: [id number]    Group#: [id number] Precert#:      " "   [facility] Phone: [phone]    Subscriber: Kelton Escamilla Subscriber#: [id number]    Group#: [id number] Precert#:              History & Physical      Shadi Mckenna MD at 3/21/2017 12:50 PM            Patient Care Team:      Chief complaint: Right sided neck pain that radiates tot he right arm and hand.  Subjective:    Patient is a 51 y.o.male presents with increasing neck pain that radiates to the right arm that is associated with numbness in his pinky. Pain is persistent. He has a previous h/o multiple shoulder surgeries.    Review of Systems:  General ROS: negative for fever, chills, malaise    Cardiovascular ROS: no chest pain or dyspnea on exertion  Respiratory ROS: no cough, shortness of breath, or wheezing      Allergies: No Known Allergies       Latex:No  Contrast Dye: No    Home Meds    Prescriptions Prior to Admission   Medication Sig Dispense Refill Last Dose   • amLODIPine (NORVASC) 2.5 MG tablet Take 2.5 mg by mouth Daily.   3/21/2017 at 0900     PMH:   Past Medical History   Diagnosis Date   • Cervical stenosis of spine    • Generalized headaches    • Hypertension    • Wears eyeglasses      PSH:    Past Surgical History   Procedure Laterality Date   • Shoulder surgery Right      times 4     Immunization History: pneumo: No  Flu: No  Tetanus: Unknown    Social History:   Tobacco: Smokeless tobacoo   Alcohol: No      Physical Exam:  Visit Vitals   • /88 (BP Location: Right arm, Patient Position: Lying)   • Pulse 71   • Temp 98.2 °F (36.8 °C) (Temporal Artery )   • Resp 20   • Ht 70.5\" (179.1 cm)   • Wt 247 lb (112 kg)   • SpO2 96%   • BMI 34.94 kg/m2         General Appearance:    Alert, cooperative, no distress, appears stated age   Head:    Normocephalic, without obvious abnormality, atraumatic   Lungs:     Clear to auscultation bilaterally, respirations unlabored    Heart: Regular rate and rhythm, S1 and S2 normal, no murmur, rub    or gallop    Abdomen:    Soft " "without tenderness   Breast Exam:    deferred   Genitalia:    deferred   Extremities:   Extremities normal, atraumatic, no cyanosis or edema   Skin:   Skin color, texture, turgor normal, no rashes or lesions   Neurologic:   Grossly intact     Results Review: Labs were reviewed    Impression: Cervical stenosis    Plan: Anterior Cervical discectomy with fusion    Ratna Don, APRN 3/21/2017 12:50 PM           Electronically signed by Shadi Mckenna MD at 3/21/2017  1:08 PM        Hospital Medications (active)       Dose Frequency Start End    acetaminophen (TYLENOL) tablet 650 mg 650 mg Every 4 Hours PRN 3/21/2017     Sig - Route: Take 2 tablets by mouth Every 4 (Four) Hours As Needed for Mild Pain (1-3). - Oral    albuterol (PROVENTIL) nebulizer solution 0.5% 2.5 mg/0.5mL 1.25 mg Every 4 Hours PRN 3/21/2017     Sig - Route: Take 0.25 mL by nebulization Every 4 (Four) Hours As Needed for Wheezing or Shortness of Air. - Nebulization    amLODIPine (NORVASC) tablet 2.5 mg 2.5 mg Daily 3/22/2017     Sig - Route: Take 1 tablet by mouth Daily. - Oral    bisacodyl (DULCOLAX) EC tablet 5 mg 5 mg Daily PRN 3/21/2017     Sig - Route: Take 1 tablet by mouth Daily As Needed for Constipation. - Oral    bisacodyl (DULCOLAX) suppository 10 mg 10 mg Daily PRN 3/21/2017     Sig - Route: Insert 1 suppository into the rectum Daily As Needed for Constipation. - Rectal    ceFAZolin in dextrose (ANCEF) IVPB solution 2 g 2 g Once 3/21/2017 3/21/2017    Sig - Route: Infuse 100 mL into a venous catheter 1 (One) Time. - Intravenous    ceFAZolin in dextrose (ANCEF) IVPB solution 2 g 2 g Every 8 Hours 3/21/2017 3/22/2017    Sig - Route: Infuse 100 mL into a venous catheter Every 8 (Eight) Hours. - Intravenous    dexamethasone (DECADRON) injection 4 mg 4 mg Every 6 Hours Scheduled 3/21/2017     Sig - Route: Infuse 1 mL into a venous catheter Every 6 (Six) Hours. - Intravenous    Linked Group 1:  \"Or\" Linked Group Details        dexamethasone " "(DECADRON) tablet 4 mg 4 mg Every 6 Hours Scheduled 3/21/2017     Sig - Route: Take 1 tablet by mouth Every 6 (Six) Hours. - Oral    Linked Group 1:  \"Or\" Linked Group Details        dextrose (D50W) solution 25 g 25 g Every 15 Minutes PRN 3/21/2017     Sig - Route: Infuse 50 mL into a venous catheter Every 15 (Fifteen) Minutes As Needed for Low Blood Sugar (Blood Sugar Less Than 70, Patient Has IV Access - Unresponsive, NPO or Unable To Safely Swallow). - Intravenous    dextrose (GLUTOSE) oral gel 15 g 15 g Every 15 Minutes PRN 3/21/2017     Sig - Route: Take 15 g by mouth Every 15 (Fifteen) Minutes As Needed for Low Blood Sugar (Blood Sugar Less Than 70, Patient Alert, Is Not NPO & Can Safely Swallow). - Oral    famotidine (PEPCID) injection 20 mg 20 mg Every 12 Hours Scheduled 3/21/2017     Sig - Route: Infuse 2 mL into a venous catheter Every 12 (Twelve) Hours. - Intravenous    Linked Group 2:  \"Or\" Linked Group Details        famotidine (PEPCID) tablet 20 mg 20 mg Once 3/21/2017 3/21/2017    Sig - Route: Take 1 tablet by mouth 1 (One) Time. - Oral    famotidine (PEPCID) tablet 20 mg 20 mg Every 12 Hours Scheduled 3/21/2017     Sig - Route: Take 1 tablet by mouth Every 12 (Twelve) Hours. - Oral    Linked Group 2:  \"Or\" Linked Group Details        FentaNYL Citrate (PF) (SUBLIMAZE) injection 50 mcg 50 mcg Every 5 Minutes PRN 3/21/2017     Sig - Route: Infuse 1 mL into a venous catheter Every 5 (Five) Minutes As Needed for Moderate Pain (4-6). - Intravenous    glucagon (GLUCAGEN) injection 1 mg 1 mg Every 15 Minutes PRN 3/21/2017     Sig - Route: Inject 1 mg under the skin Every 15 (Fifteen) Minutes As Needed (Blood Glucose Less Than 70 - Patient Without IV Access - Unresponsive, NPO or Unable To Safely Swallow). - Subcutaneous    hydrALAZINE (APRESOLINE) injection 10 mg 10 mg Every 6 Hours PRN 3/21/2017     Sig - Route: Infuse 0.5 mL into a venous catheter Every 6 (Six) Hours As Needed for High Blood Pressure (for " "SBP >160). - Intravenous    HYDROmorphone (DILAUDID) injection 0.5 mg 0.5 mg Every 5 Minutes PRN 3/21/2017     Sig - Route: Infuse 0.5 mg into a venous catheter Every 5 (Five) Minutes As Needed for Severe Pain (7-10). - Intravenous    HYDROmorphone (DILAUDID) injection 2 mg 2 mg Every 4 Hours PRN 3/21/2017 3/31/2017    Sig - Route: Inject 2 mg into the shoulder, thigh, or buttocks Every 4 (Four) Hours As Needed for Severe Pain (7-10). - Intramuscular    Linked Group 3:  \"And\" Linked Group Details        insulin lispro (humaLOG) injection 0-7 Units 0-7 Units 4 Times Daily Before Meals & Nightly 3/21/2017     Sig - Route: Inject 0-7 Units under the skin 4 (Four) Times a Day Before Meals & at Bedtime. - Subcutaneous    lactated ringers bolus 500 mL 500 mL Once As Needed 3/21/2017     Sig - Route: Infuse 500 mL into a venous catheter 1 (One) Time As Needed (for hypovolemia, call anesthesiologist before initiating). - Intravenous    lactated ringers infusion 9 mL/hr Continuous 3/21/2017     Sig - Route: Infuse 9 mL/hr into a venous catheter Continuous. - Intravenous    lidocaine PF 1% (XYLOCAINE) injection 1 mL 1 mL Once 3/21/2017 3/21/2017    Sig - Route: 1 mL by Infiltration route 1 (One) Time. - Infiltration    mupirocin (BACTROBAN) 2 % nasal ointment  Once 3/21/2017 3/21/2017    Sig - Route: by Each Nare route 1 (One) Time. - Each Nare    naloxone (NARCAN) injection 0.4 mg 0.4 mg Every 5 Minutes PRN 3/21/2017     Sig - Route: Infuse 1 mL into a venous catheter Every 5 (Five) Minutes As Needed for Respiratory Depression. - Intravenous    Linked Group 3:  \"And\" Linked Group Details        ondansetron (ZOFRAN) injection 4 mg 4 mg Every 6 Hours PRN 3/21/2017     Sig - Route: Infuse 2 mL into a venous catheter Every 6 (Six) Hours As Needed for Nausea or Vomiting. - Intravenous    oxyCODONE-acetaminophen (PERCOCET) 7.5-325 MG per tablet 1 tablet 1 tablet Every 4 Hours PRN 3/21/2017 3/31/2017    Sig - Route: Take 1 tablet " by mouth Every 4 (Four) Hours As Needed for Moderate Pain (4-6). - Oral    sodium chloride 0.9 % bolus 500 mL 500 mL Once As Needed 3/21/2017     Sig - Route: Infuse 500 mL into a venous catheter 1 (One) Time As Needed (for SBP < 90. May repeat X 1. If no result, stat H/H and notify MD). - Intravenous    sodium chloride 0.9 % flush 1-10 mL 1-10 mL As Needed 3/21/2017     Sig - Route: Infuse 1-10 mL into a venous catheter As Needed for Line Care. - Intravenous    sodium chloride 0.9 % with KCl 20 mEq/L infusion 100 mL/hr Continuous 3/21/2017     Sig - Route: Infuse 100 mL/hr into a venous catheter Continuous. - Intravenous    zolpidem (AMBIEN) tablet 5 mg 5 mg Nightly PRN 3/21/2017 3/31/2017    Sig - Route: Take 1 tablet by mouth At Night As Needed for Sleep. - Oral    atracurium injection (Discontinued)  As Needed 3/21/2017 3/21/2017    Sig - Route: Infuse  into a venous catheter As Needed. - Intravenous    Reason for Discontinue: Anesthesia Stop    dexamethasone (DECADRON) injection (Discontinued)  As Needed 3/21/2017 3/21/2017    Sig - Route: Infuse  into a venous catheter As Needed. - Intravenous    Reason for Discontinue: Anesthesia Stop    ePHEDrine injection (Discontinued)  As Needed 3/21/2017 3/21/2017    Sig - Route: Infuse  into a venous catheter As Needed. - Intravenous    Reason for Discontinue: Anesthesia Stop    FentaNYL Citrate (PF) (SUBLIMAZE) injection 50 mcg (Discontinued) 50 mcg Every 5 Minutes PRN 3/21/2017 3/21/2017    Sig - Route: Infuse 1 mL into a venous catheter Every 5 (Five) Minutes As Needed for Moderate Pain (4-6). - Intravenous    Reason for Discontinue: Patient Transfer    Cosign for Ordering: Required by Jonel Magaña MD    FentaNYL Citrate (PF) (SUBLIMAZE) injection 50 mcg (Discontinued) 50 mcg Every 5 Minutes PRN 3/21/2017 3/21/2017    Sig - Route: Infuse 1 mL into a venous catheter Every 5 (Five) Minutes As Needed for Moderate Pain (4-6). - Intravenous    Reason for Discontinue:  Patient Transfer    FentaNYL Citrate (PF) (SUBLIMAZE) injection (Discontinued)  As Needed 3/21/2017 3/21/2017    Sig - Route: Infuse  into a venous catheter As Needed for Severe Pain (7-10). - Intravenous    Reason for Discontinue: Anesthesia Stop    gelatin absorbable (GELFOAM) sponge (Discontinued)  As Needed 3/21/2017 3/21/2017    Sig: As Needed.    Reason for Discontinue: Patient Discharge    HYDROmorphone (DILAUDID) injection 0.5 mg (Discontinued) 0.5 mg Every 5 Minutes PRN 3/21/2017 3/21/2017    Sig - Route: Infuse 0.5 mg into a venous catheter Every 5 (Five) Minutes As Needed for Severe Pain (7-10). - Intravenous    Reason for Discontinue: Patient Transfer    Cosign for Ordering: Required by Jonel Magaña MD    HYDROmorphone (DILAUDID) injection 0.5 mg (Discontinued) 0.5 mg Every 5 Minutes PRN 3/21/2017 3/21/2017    Sig - Route: Infuse 0.5 mg into a venous catheter Every 5 (Five) Minutes As Needed for Severe Pain (7-10). - Intravenous    Reason for Discontinue: Patient Transfer    lactated ringers bolus 500 mL (Discontinued) 500 mL Once As Needed 3/21/2017 3/21/2017    Sig - Route: Infuse 500 mL into a venous catheter 1 (One) Time As Needed (for hypovolemia, call anesthesiologist before initiating). - Intravenous    Reason for Discontinue: Patient Transfer    Cosign for Ordering: Required by Jonel Magaña MD    lactated ringers bolus 500 mL (Discontinued) 500 mL Once As Needed 3/21/2017 3/21/2017    Sig - Route: Infuse 500 mL into a venous catheter 1 (One) Time As Needed (for hypovolemia, call anesthesiologist before initiating). - Intravenous    Reason for Discontinue: Patient Transfer    lidocaine PF 1% (XYLOCAINE) injection (Discontinued)  As Needed 3/21/2017 3/21/2017    Sig: As Needed.    Reason for Discontinue: Anesthesia Stop    Morphine PF injection (Discontinued)  As Needed 3/21/2017 3/21/2017    Sig: As Needed.    Reason for Discontinue: Anesthesia Stop    ondansetron (ZOFRAN) injection 4 mg  "(Discontinued) 4 mg Once As Needed 3/21/2017 3/21/2017    Sig - Route: Infuse 2 mL into a venous catheter 1 (One) Time As Needed for Nausea or Vomiting. - Intravenous    Reason for Discontinue: Patient Transfer    Cosign for Ordering: Required by Jonel Magaña MD    ondansetron (ZOFRAN) injection (Discontinued)  As Needed 3/21/2017 3/21/2017    Sig - Route: Infuse  into a venous catheter As Needed for Nausea or Vomiting. - Intravenous    Reason for Discontinue: Anesthesia Stop    phenylephrine (TEE-SYNEPHRINE) injection (Discontinued)  As Needed 3/21/2017 3/21/2017    Sig - Route: Infuse  into a venous catheter As Needed. - Intravenous    Reason for Discontinue: Anesthesia Stop    promethazine (PHENERGAN) injection 12.5 mg (Discontinued) 12.5 mg Once As Needed 3/21/2017 3/21/2017    Sig - Route: Infuse 0.5 mL into a venous catheter 1 (One) Time As Needed for Nausea or Vomiting. - Intravenous    Reason for Discontinue: Patient Transfer    Cosign for Ordering: Required by Jonel Magaña MD    Linked Group 4:  \"Or\" Linked Group Details        promethazine (PHENERGAN) injection 12.5 mg (Discontinued) 12.5 mg Once As Needed 3/21/2017 3/21/2017    Sig - Route: Inject 0.5 mL into the shoulder, thigh, or buttocks 1 (One) Time As Needed for Nausea or Vomiting. - Intramuscular    Reason for Discontinue: Patient Transfer    Cosign for Ordering: Required by Jonel Magaña MD    Linked Group 4:  \"Or\" Linked Group Details        promethazine (PHENERGAN) suppository 25 mg (Discontinued) 25 mg Once As Needed 3/21/2017 3/21/2017    Sig - Route: Insert 1 suppository into the rectum 1 (One) Time As Needed for Nausea or Vomiting. - Rectal    Reason for Discontinue: Patient Transfer    Cosign for Ordering: Required by Jonel Magaña MD    Linked Group 4:  \"Or\" Linked Group Details        promethazine (PHENERGAN) tablet 25 mg (Discontinued) 25 mg Once As Needed 3/21/2017 3/21/2017    Sig - Route: Take 1 tablet by mouth 1 (One) Time As " "Needed for Nausea or Vomiting. - Oral    Reason for Discontinue: Patient Transfer    Cosign for Ordering: Required by Jonel Magaña MD    Linked Group 4:  \"Or\" Linked Group Details        Propofol (DIPRIVAN) injection (Discontinued)  As Needed 3/21/2017 3/21/2017    Sig - Route: Infuse  into a venous catheter As Needed. - Intravenous    Reason for Discontinue: Anesthesia Stop    rocuronium (ZEMURON) injection (Discontinued)  As Needed 3/21/2017 3/21/2017    Sig - Route: Infuse  into a venous catheter As Needed. - Intravenous    Reason for Discontinue: Anesthesia Stop    sodium chloride 0.9 % flush 1-10 mL (Discontinued) 1-10 mL As Needed 3/21/2017 3/21/2017    Sig - Route: Infuse 1-10 mL into a venous catheter As Needed for Line Care. - Intravenous    Reason for Discontinue: Patient Transfer    sodium chloride 500 mL with bacitracin 50,000 Units, polymyxin B 500,000 Units irrigation (Discontinued)  As Needed 3/21/2017 3/21/2017    Sig: As Needed.    Reason for Discontinue: Patient Discharge    sterile water irrigation solution (Discontinued)  As Needed 3/21/2017 3/21/2017    Sig: As Needed.    Reason for Discontinue: Patient Discharge    thrombin (THROMBIN-JMI) kit (Discontinued)  As Needed 3/21/2017 3/21/2017    Sig: As Needed.    Reason for Discontinue: Patient Discharge            Lab Results (last 24 hours)     ** No results found for the last 24 hours. **        Imaging Results (last 24 hours)     Procedure Component Value Units Date/Time    FL C Arm During Surgery [[id number]]      Updated:  03/21/17 1540    FL > 1 Hour [[id number]] Resulted:  03/21/17 1541     Updated:  03/21/17 1541    Narrative:       This procedure was auto-finalized with no dictation required.          Operative/Procedure Notes (last 24 hours) (Notes from 3/20/2017  8:55 PM through 3/21/2017  8:55 PM)     No notes of this type exist for this encounter.        Physician Progress Notes (last 24 hours) (Notes from 3/20/2017  8:55 PM through " 3/21/2017  8:55 PM)     No notes of this type exist for this encounter.           Consult Notes (last 24 hours) (Notes from 3/20/2017  8:55 PM through 3/21/2017  8:55 PM)      BRAYDEN Flaherty at 3/21/2017  4:19 PM  Version 1 of 1     Consult Orders:    1. Inpatient Consult to Hospitalist [[id number]] ordered by Shadi Mckenna MD at 17 1551                Patient Name: Kelton Escamilla  MRN: [id number]  : 1965  DOS: 3/21/2017    Attending: Shadi Mckenna MD    Primary Care Provider: Ángel Lopez MD      Chief complaint:  Neck pain S/P ACDF    Subjective :  Patient is a 51 y.o. male who presented to the hospital for a cervical discectomy with fusion with Dr. Freed related to increasing neck pain that radiated to the right arm with numbness to his right pinky. He was seen in PACU where he is drowsy but will respond appropriately.  He c/o neck pain 4/10 at this time with a cervical collar in place. He was found to be diabetic on his pre-op labs that is a new diagnosis for him.  He is otherwise healthy. He denies chest pain, shortness of breath, nausea, or vomiting.  He has no other complaints and is in no acute distress.     Allergies:  No Known Allergies    Meds:  Prescriptions Prior to Admission   Medication Sig Dispense Refill Last Dose   • amLODIPine (NORVASC) 2.5 MG tablet Take 2.5 mg by mouth Daily.   3/21/2017 at 0900         History:   Past Medical History   Diagnosis Date   • Cervical stenosis of spine    • Generalized headaches    • Hypertension    • Wears eyeglasses      Past Surgical History   Procedure Laterality Date   • Shoulder surgery Right      times 4     History reviewed. No pertinent family history.  Social History   Substance Use Topics   • Smoking status: Never Smoker   • Smokeless tobacco: Current User     Types: Chew   • Alcohol use Yes      Comment: rare   Lives at home with his wife. 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     Review of Systems  Pertinent items are noted in  "HPI, all other systems reviewed and negative    Vital Signs  Visit Vitals   • /88 (BP Location: Right arm, Patient Position: Lying)   • Pulse 71   • Temp 98.2 °F (36.8 °C) (Temporal Artery )   • Resp 20   • Ht 70.5\" (179.1 cm)   • Wt 247 lb (112 kg)   • SpO2 96%   • BMI 34.94 kg/m2       Physical Exam:    General Appearance:    Alert, cooperative, in no acute distress   Neck:   Cervical collar in place with SHYLA drain and dressing under     collar. No active bleeding or hematoma.    Back:     No kyphosis present, no scoliosis present,range of motion   normal   Lungs:     Clear to auscultation,respirations regular, even and                   unlabored    Heart:    Regular rhythm and normal rate, normal S1 and S2, no            murmur, no gallop, no rub, no click   Abdomen:     Normal bowel sounds, no masses, no organomegaly, soft        non-tender, non-distended, no guarding, no rebound                 tenderness   Genitalia:    Deferred   Extremities:   Moves all extremities well, no edema, no cyanosis, no              redness   Pulses:   Pulses palpable and equal bilaterally   Skin:   No bleeding, bruising or rash   Neurologic:   Cranial nerves 2 - 12 grossly intact, sensation intact, DTR        present and equal bilaterally      I reviewed the patient's new clinical results.     Results from last 7 days  Lab Units 03/16/17  0946   WBC 10*3/mm3 7.82   HEMOGLOBIN g/dL 14.3   HEMATOCRIT % 42.9   PLATELETS 10*3/mm3 287       Results from last 7 days  Lab Units 03/16/17  0946   POTASSIUM mmol/L 4.2     Lab Results   Component Value Date    HGBA1C 6.50 (H) 03/16/2017     Assessment and Plan:   Problem:    S/P cervical spinal fusion- ACDF  Active Problems:    Acute post-op pain    Cervical stenosis    HTN (hypertension)    Diabetes II- New diagnosis    Plan  1. PT/OT  2. Pain control-prns   3. IS-encouraged  4. DVT proph-  5. Bowel regimen  6. Resume home medications as appropriate  7. Monitor post-op labs  8. DC " planning for home possibly tomorrow.   Monitor BG with SSI. Diabetic education.    BRAYDEN Flaherty  03/21/17  4:19 PM           Electronically signed by BRAYDEN Flaherty at 3/21/2017  4:39 PM

## 2017-03-23 LAB
CYTO UR: NORMAL
LAB AP CASE REPORT: NORMAL
LAB AP CLINICAL INFORMATION: NORMAL
Lab: NORMAL
PATH REPORT.FINAL DX SPEC: NORMAL
PATH REPORT.GROSS SPEC: NORMAL

## 2022-11-04 ENCOUNTER — OFFICE VISIT (OUTPATIENT)
Dept: FAMILY MEDICINE CLINIC | Facility: CLINIC | Age: 57
End: 2022-11-04

## 2022-11-04 VITALS
OXYGEN SATURATION: 99 % | SYSTOLIC BLOOD PRESSURE: 140 MMHG | BODY MASS INDEX: 32.48 KG/M2 | HEIGHT: 71 IN | WEIGHT: 232 LBS | TEMPERATURE: 98.6 F | DIASTOLIC BLOOD PRESSURE: 82 MMHG | HEART RATE: 83 BPM

## 2022-11-04 DIAGNOSIS — R05.1 ACUTE COUGH: Primary | ICD-10-CM

## 2022-11-04 LAB
EXPIRATION DATE: NORMAL
FLUAV AG UPPER RESP QL IA.RAPID: NOT DETECTED
FLUBV AG UPPER RESP QL IA.RAPID: NOT DETECTED
INTERNAL CONTROL: NORMAL
Lab: NORMAL
SARS-COV-2 RNA RESP QL NAA+PROBE: NOT DETECTED

## 2022-11-04 PROCEDURE — 87428 SARSCOV & INF VIR A&B AG IA: CPT

## 2022-11-04 PROCEDURE — 99213 OFFICE O/P EST LOW 20 MIN: CPT

## 2022-11-04 RX ORDER — BENZONATATE 100 MG/1
200 CAPSULE ORAL 3 TIMES DAILY PRN
Qty: 40 CAPSULE | Refills: 0 | Status: SHIPPED | OUTPATIENT
Start: 2022-11-04

## 2022-11-04 RX ORDER — AZITHROMYCIN 250 MG/1
TABLET, FILM COATED ORAL
Qty: 6 TABLET | Refills: 0 | Status: SHIPPED | OUTPATIENT
Start: 2022-11-04

## 2022-11-04 RX ORDER — FLUTICASONE PROPIONATE 50 MCG
2 SPRAY, SUSPENSION (ML) NASAL DAILY
Qty: 9.9 ML | Refills: 2 | Status: SHIPPED | OUTPATIENT
Start: 2022-11-04

## 2022-11-04 NOTE — PROGRESS NOTES
Office Note     Name: Kelton Escamilla    : 1965     MRN: [id number]     Chief Complaint  bodyaches, Headache, and Cough (And congestion)    Subjective     History of Present Illness:  Kelton Escamilla is a 56 y.o. male who presents today for symptoms of cough, headaches, and body aches.  He reports that his symptoms started around 6 days ago.  He reports that he passed out candy at a community [employer] event, and the next day he started feeling sick.  He reports that his worst symptom is a very persistent cough.  He reports that it is mostly dry and hacking, however will occasionally be productive.  He reports that the cough is nonstop, and that he has coughed so much he is starting to hurt in his back and abdomen.  He does report that the cough is keeping him up at night and interfering with his ability to sleep well.  He denies any shortness of air or chest pain.  He also reports a headache.  He reports the headache is located on both sides of his head and is a sensation of pressure.  He reports that Aleve helps to relieve his headache.  He reports that he has not manually taken his temperature, however he has felt chills.  He also reports head congestion and a runny nose.  He reports that he has heard a slight wheeze when lying down at night.  He has no history of smoking or asthma.  He denies any abdominal symptoms, no pain, nausea, vomiting, diarrhea, or constipation.  He has no known sick contacts.  He has taken no over-the-counter medications other than Aleve for his symptoms.  He denies any sore throat, but does report he has a lot of postnasal drainage.  He reports he is continuing to drink.  He reports that he has eaten less than normal.      Review of Systems:   Review of Systems   Constitutional: Positive for appetite change, chills, fatigue and fever.   HENT: Positive for congestion, postnasal drip, rhinorrhea and sinus pressure. Negative for ear pain, sore throat and trouble swallowing.     Respiratory: Positive for cough and wheezing. Negative for chest tightness and shortness of breath.    Cardiovascular: Negative for chest pain and leg swelling.   Gastrointestinal: Negative for abdominal pain, constipation, diarrhea, nausea and vomiting.   Musculoskeletal: Positive for arthralgias and myalgias.   Neurological: Positive for headache. Negative for weakness and light-headedness.       Past Medical History:   Past Medical History:   Diagnosis Date   • Acute bronchitis    • Allergic rhinitis, seasonal    • Blurred vision, bilateral    • BMI 34.0-34.9,adult    • Cervical stenosis of spine    • Cervicalgia    • Chest pain, localized    • Chronic thoracic back pain    • Concussion    • Cough    • Diabetes (HCC)    • DISH (diffuse idiopathic skeletal hyperostosis)    • Exercise counseling    • Generalized headaches    • Hematuria    • Hyperlipidemia    • Hypertension    • Insomnia    • Left leg injury    • Left low back pain    • Nutritional counseling    • Prostatitis    • Radiculopathy, cervical    • Right knee pain    • Sinusitis, acute    • Tension headache    • Thoracic back pain    • Urinary frequency    • Wears eyeglasses        Past Surgical History:   Past Surgical History:   Procedure Laterality Date   • ANTERIOR CERVICAL DISCECTOMY W/ FUSION N/A 3/21/2017    Procedure: CERVICAL DISCECTOMY ANTERIOR WITH FUSION WITH INSTRUMENTATION;  Surgeon: Shadi Mckenna MD;  Location: [facility];  Service:    • SHOULDER SURGERY Right     times 4       Immunizations:   Immunization History   Administered Date(s) Administered   • COVID-19 (MODERNA) 1st, 2nd, 3rd Dose Only 12/30/2020, 01/29/2021, 01/14/2022   • Influenza, Unspecified 10/27/2020        Medications:     Current Outpatient Medications:   •  amLODIPine (NORVASC) 10 MG tablet, Take 1 tablet by mouth Daily., Disp: , Rfl:   •  amLODIPine (NORVASC) 2.5 MG tablet, Take 2.5 mg by mouth Daily., Disp: , Rfl:   •  losartan (COZAAR) 100 MG tablet, Take 1  "tablet by mouth Daily., Disp: , Rfl:   •  metFORMIN ER (GLUCOPHAGE-XR) 500 MG 24 hr tablet, Take 2 tablets by mouth 2 (Two) Times a Day., Disp: , Rfl:   •  rosuvastatin (CRESTOR) 20 MG tablet, Take 1 tablet by mouth Daily., Disp: , Rfl:   •  azithromycin (Zithromax) 250 MG tablet, Take two pills (500 mg) on day 1, then take 1 pill (250 mg) once daily for four days, Disp: 6 tablet, Rfl: 0  •  benzonatate (Tessalon Perles) 100 MG capsule, Take 2 capsules by mouth 3 (Three) Times a Day As Needed for Cough., Disp: 40 capsule, Rfl: 0  •  fluticasone (Flonase) 50 MCG/ACT nasal spray, 2 sprays into the nostril(s) as directed by provider Daily., Disp: 9.9 mL, Rfl: 2    Allergies:   No Known Allergies    Family History:   Family History   Problem Relation Age of Onset   • Stroke Other    • Diabetes Other    • Hypertension Other    • Prostate cancer Other        Social History:   Social History     Socioeconomic History   • Marital status:    Tobacco Use   • Smoking status: Never   • Smokeless tobacco: Current     Types: Chew   Vaping Use   • Vaping Use: Never used   Substance and Sexual Activity   • Alcohol use: Yes     Comment: rare   • Drug use: No   • Sexual activity: Yes     Partners: Female     Comment: spouse         Objective     Vital Signs  /82 (BP Location: Left arm, Patient Position: Sitting, Cuff Size: Adult)   Pulse 83   Temp 98.6 °F (37 °C)   Ht 179.1 cm (70.5\")   Wt 105 kg (232 lb)   SpO2 99%   BMI 32.82 kg/m²   Estimated body mass index is 32.82 kg/m² as calculated from the following:    Height as of this encounter: 179.1 cm (70.5\").    Weight as of this encounter: 105 kg (232 lb).          Physical Exam  Vitals and nursing note reviewed.   Constitutional:       General: He is not in acute distress.     Appearance: Normal appearance. He is not ill-appearing, toxic-appearing or diaphoretic.   HENT:      Head: Normocephalic and atraumatic.      Right Ear: Tympanic membrane, ear canal and " external ear normal.      Left Ear: Tympanic membrane, ear canal and external ear normal.      Nose: Congestion present.      Mouth/Throat:      Mouth: Mucous membranes are moist.      Pharynx: Uvula midline. No pharyngeal swelling, oropharyngeal exudate or posterior oropharyngeal erythema.      Tonsils: No tonsillar exudate.      Comments: Drainage is visible in posterior pharynx  Eyes:      General:         Right eye: No discharge.         Left eye: No discharge.      Extraocular Movements: Extraocular movements intact.   Cardiovascular:      Rate and Rhythm: Normal rate and regular rhythm.      Heart sounds: No murmur heard.    No friction rub. No gallop.   Pulmonary:      Effort: Pulmonary effort is normal. No respiratory distress.      Breath sounds: No wheezing, rhonchi or rales.      Comments: Dry persistent cough during exam  Musculoskeletal:      Cervical back: Neck supple.   Lymphadenopathy:      Cervical: Cervical adenopathy present.   Skin:     General: Skin is warm.   Neurological:      Mental Status: He is alert and oriented to person, place, and time.      Gait: Gait normal.   Psychiatric:         Mood and Affect: Mood normal.         Thought Content: Thought content normal.          Assessment and Plan     1. Acute cough  -He is negative for COVID and influenza in the office.  -We discussed that most cases of acute bronchitis are due to a viral origin.  However due to his prolonged symptoms, and with him being seen on a Friday, we will proceed with azithromycin antibiotics.  Wait and see if you start to feel better over the next few days.  If you do not then you can initiate antibiotic therapy.  -I have also called in some Tessalon Perles to help with cough.  We discussed the cough is a protective mechanism to help get secretions out of the body.  However the cough is interfering with his sleep.  -We also discussed supportive management, such as using honey as a natural antitussive, increasing fluid  intake, Vicks VapoRub on the chest, taking hot steamy showers to open up congestion, salt water gargles if he has a sore throat, Tylenol or Motrin for fever or headaches or body aches.  -I have also prescribed Flonase to help relieve his nasal congestion and to try to decrease the postnasal drainage.  -If he gets shortness of air or chest pain, he should seek emergent care.  -I encouraged him to call or return to the clinic if his symptoms acutely worsen or persist.  -He verbalized understanding and had no further questions.  - benzonatate (Tessalon Perles) 100 MG capsule; Take 2 capsules by mouth 3 (Three) Times a Day As Needed for Cough.  Dispense: 40 capsule; Refill: 0  - fluticasone (Flonase) 50 MCG/ACT nasal spray; 2 sprays into the nostril(s) as directed by provider Daily.  Dispense: 9.9 mL; Refill: 2  - azithromycin (Zithromax) 250 MG tablet; Take two pills (500 mg) on day 1, then take 1 pill (250 mg) once daily for four days  Dispense: 6 tablet; Refill: 0  - Covid-19 + Flu A&B AG, Veritor       Follow Up  No follow-ups on file.    [facility]  [address] [place] [address] [place] [address] [phone]

## 2022-11-14 ENCOUNTER — OFFICE VISIT (OUTPATIENT)
Dept: FAMILY MEDICINE CLINIC | Facility: CLINIC | Age: 57
End: 2022-11-14

## 2022-11-14 VITALS
RESPIRATION RATE: 17 BRPM | OXYGEN SATURATION: 96 % | SYSTOLIC BLOOD PRESSURE: 132 MMHG | DIASTOLIC BLOOD PRESSURE: 70 MMHG | WEIGHT: 235 LBS | TEMPERATURE: 98.5 F | HEART RATE: 72 BPM | BODY MASS INDEX: 33.24 KG/M2

## 2022-11-14 DIAGNOSIS — R05.2 SUBACUTE COUGH: Primary | ICD-10-CM

## 2022-11-14 PROCEDURE — 99214 OFFICE O/P EST MOD 30 MIN: CPT

## 2022-11-14 RX ORDER — DEXTROMETHORPHAN POLISTIREX 30 MG/5ML
60 SUSPENSION ORAL EVERY 12 HOURS SCHEDULED
Qty: 148 ML | Refills: 0 | Status: CANCELLED | OUTPATIENT
Start: 2022-11-14

## 2022-11-14 RX ORDER — PREDNISONE 10 MG/1
60 TABLET ORAL DAILY
Qty: 18 TABLET | Refills: 0 | Status: SHIPPED | OUTPATIENT
Start: 2022-11-14 | End: 2022-11-17

## 2022-11-14 RX ORDER — ALBUTEROL SULFATE 90 UG/1
2 AEROSOL, METERED RESPIRATORY (INHALATION)
Qty: 6.7 G | Refills: 0 | Status: SHIPPED | OUTPATIENT
Start: 2022-11-14

## 2022-11-14 NOTE — PROGRESS NOTES
Office Note     Name: Kelton Escamilla    : 1965     MRN: [id number]     Chief Complaint  Persistent Cough    Subjective     History of Present Illness:  Kelton Escamilla is a 56 y.o. male who presents today for a persistent cough.  He was seen on 2022 for symptoms of bronchitis.  He was given azithromycin, Tessalon Perles, and Flonase.  He was negative for COVID and flu at that time.  He reports that he symptomatically feels much better.  He reports that he no longer is having symptoms of congestion and that it helped to dry up the secretions.  He reports that now he is having a persistent cough.  He reports the cough is intermittently productive, however it is mostly dry.  He reports that it is interfering with his ability to sleep at night.  This is causing him to be tired during the day.  He reports that the Tessalon Perles are not helping his cough.  He has also tried spoonfuls of honey and cough syrup tablets with no relief.  He denies any sinus pressure or rhinorrhea.  He denies any sore throat.  He denies fever or chills.  He denies wheezing, shortness of air or chest pain.  He denies any asthma history or a smoking history.  He has no abdominal symptoms, such as pain, nausea or vomiting, diarrhea or constipation.    Review of Systems:   Review of Systems   Constitutional: Positive for appetite change (Little decreased) and fatigue. Negative for chills, diaphoresis and fever.   HENT: Negative for congestion, ear pain, postnasal drip, rhinorrhea, sinus pressure, sneezing, sore throat and trouble swallowing.    Respiratory: Positive for cough. Negative for chest tightness, shortness of breath and wheezing.    Cardiovascular: Negative for chest pain.   Gastrointestinal: Negative for abdominal pain, constipation, diarrhea, nausea and vomiting.   Musculoskeletal: Negative for arthralgias and myalgias.   Neurological: Negative for dizziness, syncope, weakness, light-headedness and headache.        Past Medical History:   Past Medical History:   Diagnosis Date   • Acute bronchitis    • Allergic rhinitis, seasonal    • Blurred vision, bilateral    • BMI 34.0-34.9,adult    • Cervical stenosis of spine    • Cervicalgia    • Chest pain, localized    • Chronic thoracic back pain    • Concussion    • Cough    • Diabetes (HCC)    • DISH (diffuse idiopathic skeletal hyperostosis)    • Exercise counseling    • Generalized headaches    • Hematuria    • Hyperlipidemia    • Hypertension    • Insomnia    • Left leg injury    • Left low back pain    • Nutritional counseling    • Prostatitis    • Radiculopathy, cervical    • Right knee pain    • Sinusitis, acute    • Tension headache    • Thoracic back pain    • Urinary frequency    • Wears eyeglasses        Past Surgical History:   Past Surgical History:   Procedure Laterality Date   • ANTERIOR CERVICAL DISCECTOMY W/ FUSION N/A 3/21/2017    Procedure: CERVICAL DISCECTOMY ANTERIOR WITH FUSION WITH INSTRUMENTATION;  Surgeon: Shadi Mckenna MD;  Location: [facility];  Service:    • SHOULDER SURGERY Right     times 4       Immunizations:   Immunization History   Administered Date(s) Administered   • COVID-19 (MODERNA) 1st, 2nd, 3rd Dose Only 12/30/2020, 01/29/2021, 01/14/2022   • Influenza, Unspecified 10/27/2020        Medications:     Current Outpatient Medications:   •  albuterol sulfate  (90 Base) MCG/ACT inhaler, Inhale 2 puffs 4 (Four) Times a Day., Disp: 6.7 g, Rfl: 0  •  amLODIPine (NORVASC) 10 MG tablet, Take 1 tablet by mouth Daily., Disp: , Rfl:   •  amLODIPine (NORVASC) 2.5 MG tablet, Take 2.5 mg by mouth Daily., Disp: , Rfl:   •  azithromycin (Zithromax) 250 MG tablet, Take two pills (500 mg) on day 1, then take 1 pill (250 mg) once daily for four days, Disp: 6 tablet, Rfl: 0  •  benzonatate (Tessalon Perles) 100 MG capsule, Take 2 capsules by mouth 3 (Three) Times a Day As Needed for Cough., Disp: 40 capsule, Rfl: 0  •  fluticasone (Flonase) 50  "MCG/ACT nasal spray, 2 sprays into the nostril(s) as directed by provider Daily., Disp: 9.9 mL, Rfl: 2  •  losartan (COZAAR) 100 MG tablet, Take 1 tablet by mouth Daily., Disp: , Rfl:   •  metFORMIN ER (GLUCOPHAGE-XR) 500 MG 24 hr tablet, Take 2 tablets by mouth 2 (Two) Times a Day., Disp: , Rfl:   •  predniSONE (DELTASONE) 10 MG tablet, Take 6 tablets by mouth Daily for 3 days., Disp: 18 tablet, Rfl: 0  •  rosuvastatin (CRESTOR) 20 MG tablet, Take 1 tablet by mouth Daily., Disp: , Rfl:     Allergies:   No Known Allergies    Family History:   Family History   Problem Relation Age of Onset   • Stroke Other    • Diabetes Other    • Hypertension Other    • Prostate cancer Other        Social History:   Social History     Socioeconomic History   • Marital status:    Tobacco Use   • Smoking status: Never   • Smokeless tobacco: Current     Types: Chew   Vaping Use   • Vaping Use: Never used   Substance and Sexual Activity   • Alcohol use: Yes     Comment: rare   • Drug use: No   • Sexual activity: Yes     Partners: Female     Comment: spouse         Objective     Vital Signs  /70 (BP Location: Left arm, Patient Position: Sitting, Cuff Size: Adult)   Pulse 72   Temp 98.5 °F (36.9 °C)   Resp 17   Wt 107 kg (235 lb)   SpO2 96%   BMI 33.24 kg/m²   Estimated body mass index is 33.24 kg/m² as calculated from the following:    Height as of 11/4/22: 179.1 cm (70.5\").    Weight as of this encounter: 107 kg (235 lb).          Physical Exam  Vitals and nursing note reviewed.   Constitutional:       General: He is not in acute distress.     Appearance: Normal appearance. He is not ill-appearing, toxic-appearing or diaphoretic.   HENT:      Head: Normocephalic and atraumatic.      Right Ear: Tympanic membrane, ear canal and external ear normal.      Left Ear: Tympanic membrane, ear canal and external ear normal.      Nose: No congestion or rhinorrhea.      Mouth/Throat:      Mouth: Mucous membranes are moist.      " Pharynx: No oropharyngeal exudate or posterior oropharyngeal erythema.   Eyes:      Extraocular Movements: Extraocular movements intact.   Cardiovascular:      Rate and Rhythm: Normal rate and regular rhythm.      Heart sounds: No murmur heard.    No friction rub. No gallop.   Pulmonary:      Effort: Pulmonary effort is normal. No respiratory distress.      Breath sounds: No wheezing, rhonchi or rales.      Comments: Dry cough present  Musculoskeletal:      Cervical back: Normal range of motion.   Lymphadenopathy:      Cervical: No cervical adenopathy.   Skin:     General: Skin is warm.      Coloration: Skin is not pale.   Neurological:      Mental Status: He is alert and oriented to person, place, and time. Mental status is at baseline.   Psychiatric:         Mood and Affect: Mood normal.         Thought Content: Thought content normal.          Assessment and Plan     1. Subacute cough  -He is continuing to cough, despite conservative treatment with Tessalon Perles, honey as a natural cough suppressant, Vicks vapor rub, etc.  I did encourage him to continue with the conservative treatment methods that are providing relief and increase fluid intake.  -His lungs are clear to auscultation.  His vitals are all within normal limits.  -I discussed his case with my supervising physician.  He recommended oral steroids and an albuterol inhaler, as it is possible that some of his cough could be precipitated by bronchospasm or a restrictive airway component.  -The patient and I did discuss risks and benefits of using steroids, such as elevating his blood sugar, increasing restlessness, increasing hunger, irritability, and effects on bone health.  He would like to move forward with treatment with steroid at this time.  -I encouraged him to call or return to care if his symptoms worsen or fail to improve.  - predniSONE (DELTASONE) 10 MG tablet; Take 6 tablets by mouth Daily for 3 days.  Dispense: 18 tablet; Refill: 0  -  albuterol sulfate  (90 Base) MCG/ACT inhaler; Inhale 2 puffs 4 (Four) Times a Day.  Dispense: 6.7 g; Refill: 0       Follow Up  Return if symptoms worsen or fail to improve.    [facility]  [address] [place] [address] [place] [address] [phone]

## 2022-12-01 ENCOUNTER — OFFICE VISIT (OUTPATIENT)
Dept: FAMILY MEDICINE CLINIC | Facility: CLINIC | Age: 57
End: 2022-12-01

## 2022-12-01 VITALS
HEIGHT: 72 IN | OXYGEN SATURATION: 98 % | HEART RATE: 88 BPM | DIASTOLIC BLOOD PRESSURE: 80 MMHG | SYSTOLIC BLOOD PRESSURE: 126 MMHG | WEIGHT: 230 LBS | BODY MASS INDEX: 31.15 KG/M2 | TEMPERATURE: 98.4 F

## 2022-12-01 DIAGNOSIS — R05.3 CHRONIC COUGH: Primary | ICD-10-CM

## 2022-12-01 PROCEDURE — 99214 OFFICE O/P EST MOD 30 MIN: CPT

## 2022-12-01 RX ORDER — BROMPHENIRAMINE MALEATE, PSEUDOEPHEDRINE HYDROCHLORIDE, AND DEXTROMETHORPHAN HYDROBROMIDE 2; 30; 10 MG/5ML; MG/5ML; MG/5ML
10 SYRUP ORAL 4 TIMES DAILY PRN
Qty: 473 ML | Refills: 0 | Status: SHIPPED | OUTPATIENT
Start: 2022-12-01

## 2022-12-01 RX ORDER — CETIRIZINE HYDROCHLORIDE 10 MG/1
10 TABLET ORAL DAILY
Qty: 30 TABLET | Refills: 2 | Status: SHIPPED | OUTPATIENT
Start: 2022-12-01

## 2022-12-01 NOTE — PROGRESS NOTES
Office Note     Name: Kelton Escamilla    : 1965     MRN: [id number]     Chief Complaint  Cough for 1 month    Subjective     History of Present Illness:  Kelton Escamilla is a 56 y.o. male who presents today for chronic cough.  He reports that he has had a cough for around 1 month.  He reports that it has waxed and waned in intensity.  He has been seen 2 times prior to this appointment.  He does also have some postnasal drainage.  He has been treated with a 3-day course of steroids, Flonase, Tessalon Perles, albuterol inhaler, and azithromycin.  He reports that some of these treatments have improved the cough, but it has not gone away.  He is continuing to do Flonase intermittently and albuterol inhaler intermittently.  He does report that his back and stomach are sore from coughing.  The cough is productive on and off.  He reports that his nose will run once or twice per week.  He reports that he is not sleeping well at night due to cough.  He denies any fever or chills.  He denies any wheezing.  He denies chest tightness, shortness of air, or chest pain.  He denies leg swelling, orthopnea, or PND.  He is not a current or former smoker.    Review of Systems:   Review of Systems   Constitutional: Positive for fatigue. Negative for chills and fever.   HENT: Positive for postnasal drip. Negative for congestion and sore throat.    Respiratory: Positive for cough. Negative for chest tightness, shortness of breath and wheezing.    Cardiovascular: Negative for chest pain and palpitations.   Gastrointestinal: Negative for diarrhea, nausea and vomiting.   Musculoskeletal: Positive for myalgias.   Neurological: Negative for dizziness, syncope, weakness, light-headedness and headache.       Past Medical History:   Past Medical History:   Diagnosis Date   • Acute bronchitis    • Allergic rhinitis, seasonal    • Blurred vision, bilateral    • BMI 34.0-34.9,adult    • Cervical stenosis of spine    • Cervicalgia    •  Chest pain, localized    • Chronic thoracic back pain    • Concussion    • Cough    • Diabetes (HCC)    • DISH (diffuse idiopathic skeletal hyperostosis)    • Exercise counseling    • Generalized headaches    • Hematuria    • Hyperlipidemia    • Hypertension    • Insomnia    • Left leg injury    • Left low back pain    • Nutritional counseling    • Prostatitis    • Radiculopathy, cervical    • Right knee pain    • Sinusitis, acute    • Tension headache    • Thoracic back pain    • Urinary frequency    • Wears eyeglasses        Past Surgical History:   Past Surgical History:   Procedure Laterality Date   • ANTERIOR CERVICAL DISCECTOMY W/ FUSION N/A 3/21/2017    Procedure: CERVICAL DISCECTOMY ANTERIOR WITH FUSION WITH INSTRUMENTATION;  Surgeon: Shadi Mckenna MD;  Location: [facility];  Service:    • SHOULDER SURGERY Right     times 4       Immunizations:   Immunization History   Administered Date(s) Administered   • COVID-19 (MODERNA) 1st, 2nd, 3rd Dose Only 12/30/2020, 01/29/2021, 01/14/2022   • Influenza, Unspecified 10/27/2020        Medications:     Current Outpatient Medications:   •  fluticasone (Flonase) 50 MCG/ACT nasal spray, 2 sprays into the nostril(s) as directed by provider Daily., Disp: 9.9 mL, Rfl: 2  •  losartan (COZAAR) 100 MG tablet, Take 1 tablet by mouth Daily., Disp: , Rfl:   •  metFORMIN ER (GLUCOPHAGE-XR) 500 MG 24 hr tablet, Take 2 tablets by mouth 2 (Two) Times a Day., Disp: , Rfl:   •  rosuvastatin (CRESTOR) 20 MG tablet, Take 1 tablet by mouth Daily., Disp: , Rfl:   •  albuterol sulfate  (90 Base) MCG/ACT inhaler, Inhale 2 puffs 4 (Four) Times a Day., Disp: 6.7 g, Rfl: 0  •  amLODIPine (NORVASC) 10 MG tablet, Take 1 tablet by mouth Daily., Disp: , Rfl:   •  amLODIPine (NORVASC) 2.5 MG tablet, Take 2.5 mg by mouth Daily., Disp: , Rfl:   •  azithromycin (Zithromax) 250 MG tablet, Take two pills (500 mg) on day 1, then take 1 pill (250 mg) once daily for four days, Disp: 6 tablet, Rfl:  "0  •  benzonatate (Tessalon Perles) 100 MG capsule, Take 2 capsules by mouth 3 (Three) Times a Day As Needed for Cough., Disp: 40 capsule, Rfl: 0  •  brompheniramine-pseudoephedrine-DM 30-2-10 MG/5ML syrup, Take 10 mL by mouth 4 (Four) Times a Day As Needed for Allergies., Disp: 473 mL, Rfl: 0  •  cetirizine (zyrTEC) 10 MG tablet, Take 1 tablet by mouth Daily., Disp: 30 tablet, Rfl: 2    Allergies:   No Known Allergies    Family History:   Family History   Problem Relation Age of Onset   • Stroke Other    • Diabetes Other    • Hypertension Other    • Prostate cancer Other        Social History:   Social History     Socioeconomic History   • Marital status:    Tobacco Use   • Smoking status: Never   • Smokeless tobacco: Current     Types: Chew   Vaping Use   • Vaping Use: Never used   Substance and Sexual Activity   • Alcohol use: Yes     Comment: OCC   • Drug use: No   • Sexual activity: Yes     Partners: Female     Comment: spouse         Objective     Vital Signs  /80 (BP Location: Left arm, Patient Position: Sitting, Cuff Size: Adult)   Pulse 88   Temp 98.4 °F (36.9 °C) (Temporal)   Ht 182.9 cm (72\")   Wt 104 kg (230 lb)   SpO2 98%   BMI 31.19 kg/m²   Estimated body mass index is 31.19 kg/m² as calculated from the following:    Height as of this encounter: 182.9 cm (72\").    Weight as of this encounter: 104 kg (230 lb).          Physical Exam  Vitals and nursing note reviewed.   Constitutional:       General: He is not in acute distress.     Appearance: Normal appearance. He is not ill-appearing, toxic-appearing or diaphoretic.   HENT:      Head: Normocephalic and atraumatic.      Right Ear: Tympanic membrane, ear canal and external ear normal.      Left Ear: Tympanic membrane, ear canal and external ear normal.      Nose: No congestion.      Mouth/Throat:      Pharynx: Uvula midline. Posterior oropharyngeal erythema present. No pharyngeal swelling or oropharyngeal exudate.   Eyes:      " Extraocular Movements: Extraocular movements intact.   Cardiovascular:      Rate and Rhythm: Normal rate and regular rhythm.      Heart sounds: No murmur heard.    No friction rub. No gallop.   Pulmonary:      Effort: Pulmonary effort is normal. No respiratory distress.      Breath sounds: No wheezing, rhonchi or rales.      Comments: Dry cough heard during exam.  Musculoskeletal:      Cervical back: Normal range of motion.   Skin:     Coloration: Skin is not pale.   Neurological:      Mental Status: He is alert and oriented to person, place, and time. Mental status is at baseline.   Psychiatric:         Mood and Affect: Mood normal.         Thought Content: Thought content normal.          Assessment and Plan     1. Chronic cough  -We will investigate chronic cough further with x-ray of sinuses and chest.  -We discussed possible etiologies of chronic cough: Including postnasal drainage and allergic mediated, restrictive airway mediated, other pulmonary causes.  -At this time we will continue to treat as if this is allergy mediated, using Flonase twice daily faithfully, Zyrtec daily, and cough syrup as needed at night to help with sleep.  He should not drive or operate heavy machinery after taking cough syrup.  -We also discussed that he continue with the inhaler.  -We discussed the importance of being consistent with allergy medications to keep symptoms at bay.  A follow-up appointment has been scheduled from 2 weeks from now.  If symptoms are no better, we may need to explore other treatment options and explore a possible referral to pulmonology.  -If he develops infectious symptoms of pneumonia, such as chest pain, wheeze, fever or chills, he should return to care sooner.  -If he develops any shortness of air or chest pain, he should return to care or seek emergent care.  -He verbalizes understanding and has no further questions.  - XR Sinuses 3+ View; Future  - XR Chest PA & Lateral; Future  -  brompheniramine-pseudoephedrine-DM 30-2-10 MG/5ML syrup; Take 10 mL by mouth 4 (Four) Times a Day As Needed for Allergies.  Dispense: 473 mL; Refill: 0  - cetirizine (zyrTEC) 10 MG tablet; Take 1 tablet by mouth Daily.  Dispense: 30 tablet; Refill: 2       Follow Up  Return in about 2 weeks (around 12/15/2022).    MEKHI Metcalf [facility]  [address] [place] [address] [address] [place] [address] [phone]

## 2023-09-26 ENCOUNTER — OFFICE VISIT (OUTPATIENT)
Dept: FAMILY MEDICINE CLINIC | Facility: CLINIC | Age: 58
End: 2023-09-26
Payer: COMMERCIAL

## 2023-09-26 VITALS
HEART RATE: 76 BPM | HEIGHT: 72 IN | RESPIRATION RATE: 16 BRPM | WEIGHT: 226.8 LBS | DIASTOLIC BLOOD PRESSURE: 80 MMHG | SYSTOLIC BLOOD PRESSURE: 126 MMHG | BODY MASS INDEX: 30.72 KG/M2 | OXYGEN SATURATION: 96 % | TEMPERATURE: 98.4 F

## 2023-09-26 DIAGNOSIS — Z12.5 SCREENING PSA (PROSTATE SPECIFIC ANTIGEN): ICD-10-CM

## 2023-09-26 DIAGNOSIS — G89.29 CHRONIC LEFT HIP PAIN: Primary | ICD-10-CM

## 2023-09-26 DIAGNOSIS — Z00.00 ROUTINE GENERAL MEDICAL EXAMINATION AT A HEALTH CARE FACILITY: ICD-10-CM

## 2023-09-26 DIAGNOSIS — Z23 NEED FOR TDAP VACCINATION: ICD-10-CM

## 2023-09-26 DIAGNOSIS — Z23 NEED FOR SHINGLES VACCINE: ICD-10-CM

## 2023-09-26 DIAGNOSIS — E11.65 TYPE 2 DIABETES MELLITUS WITH HYPERGLYCEMIA, WITHOUT LONG-TERM CURRENT USE OF INSULIN: ICD-10-CM

## 2023-09-26 DIAGNOSIS — M25.552 CHRONIC LEFT HIP PAIN: Primary | ICD-10-CM

## 2023-09-26 DIAGNOSIS — I10 PRIMARY HYPERTENSION: ICD-10-CM

## 2023-09-26 DIAGNOSIS — E78.2 MIXED HYPERLIPIDEMIA: ICD-10-CM

## 2023-09-26 LAB
EXPIRATION DATE: NORMAL
HBA1C MFR BLD: 5.9 %
Lab: NORMAL

## 2023-09-26 RX ORDER — AMLODIPINE BESYLATE 10 MG/1
10 TABLET ORAL DAILY
Qty: 30 TABLET | Refills: 5 | Status: SHIPPED | OUTPATIENT
Start: 2023-09-26

## 2023-09-26 RX ORDER — CYCLOBENZAPRINE HCL 5 MG
5 TABLET ORAL 3 TIMES DAILY PRN
Qty: 30 TABLET | Refills: 0 | Status: SHIPPED | OUTPATIENT
Start: 2023-09-26

## 2023-09-26 RX ORDER — METFORMIN HYDROCHLORIDE 500 MG/1
1000 TABLET, EXTENDED RELEASE ORAL 2 TIMES DAILY
Qty: 120 TABLET | Refills: 5 | Status: SHIPPED | OUTPATIENT
Start: 2023-09-26

## 2023-09-26 RX ORDER — ROSUVASTATIN CALCIUM 40 MG/1
40 TABLET, COATED ORAL DAILY
Qty: 30 TABLET | Refills: 5 | Status: SHIPPED | OUTPATIENT
Start: 2023-09-26

## 2023-09-26 RX ORDER — ROSUVASTATIN CALCIUM 20 MG/1
40 TABLET, COATED ORAL DAILY
Qty: 30 TABLET | Refills: 5 | Status: SHIPPED | OUTPATIENT
Start: 2023-09-26 | End: 2023-09-26

## 2023-09-26 RX ORDER — PREDNISONE 10 MG/1
TABLET ORAL
Qty: 36 TABLET | Refills: 0 | Status: SHIPPED | OUTPATIENT
Start: 2023-09-26 | End: 2023-10-03

## 2023-09-26 RX ORDER — LOSARTAN POTASSIUM 100 MG/1
100 TABLET ORAL DAILY
Qty: 30 TABLET | Refills: 5 | Status: SHIPPED | OUTPATIENT
Start: 2023-09-26

## 2023-09-26 NOTE — PROGRESS NOTES
"    Office Note     Name: Kelton Escamilla    : 1965     MRN: [id number]     Chief Complaint  Chronic left hip pain, hypertension, diabetes    History of Present Illness:  Kelton Escamilla is a 57 y.o. male who presents today for left hip pain.  He reports that he has been experiencing left hip pain on and off for the past 1.5 years.  He reports the pain is located to the lateral posterior aspect of his left hip.  He reports that bending, walking and laying on the hip worsen the pain.  He reports that taking hot showers usually help the pain.  He does report some pain and stiffness in his left buttock muscles.  He denies any radiation of pain into his lower leg.  He denies any shooting pains down his leg.  He denies any weakness of the left leg.  He reports that when he is walking, his pain can be as significant as a 7 or 8 out of 10.  He denies any swelling or inflammation of the joint site.  He denies any right-sided hip pain.  He denies any left lower leg swelling, pain in calf, or erythema.  He does report that he is \"bowlegged\" and wonders if this is contributing to his hip pain.  He did play football in high school but denies any significant trauma such as recent falls, car accidents in the past, etc.  He does take 1 Aleve in the morning and this does help but does not completely resolve his pain.  He denies pain in the knee or midline spine.  He denies saddle anesthesia, bladder or bowel incontinence, or erectile dysfunction.    He reports his blood pressure has been doing well at home.  He is currently taking 10 mg of amlodipine and 100 mg of losartan.  He reports he is tolerating medication well.  He reports his blood pressures been doing well.  He denies any side effects to the medications, denies lower extremity swelling.    He does admit that he does not check his blood sugar regularly.  He does admit that he does not take his metformin as prescribed.  He reports that it causes GI upset if he " takes the full dose, so he takes less than the prescribed dose.  He does try to watch his carbohydrate intake.  He has not been as physically active as he would like to be recently due to chronic left hip pain.  He is up-to-date on his diabetic eye exam, reports he saw his optometrist last week.  He denies any neuropathy symptoms in his feet.    He denies chest pain, chest pain with exertion, orthopnea, dyspnea at rest or dyspnea with exertion.  He denies other gastroenterologic, neurologic, or other concerns.    Subjective     Review of Systems:   Review of Systems   Constitutional:  Negative for chills and fever.   Respiratory:  Negative for shortness of breath.    Cardiovascular:  Negative for chest pain.   Musculoskeletal:  Positive for arthralgias, back pain (L hip pain) and gait problem. Negative for joint swelling.   Neurological:  Negative for dizziness, weakness and light-headedness.     I have reviewed the patients family history, social history, past medical history, past surgical history and have updated it as appropriate.     Past Medical History:   Past Medical History:   Diagnosis Date    Acute bronchitis     Allergic rhinitis, seasonal     Blurred vision, bilateral     BMI 34.0-34.9,adult     Cervical stenosis of spine     Cervicalgia     Chest pain, localized     Chronic thoracic back pain     Concussion     Cough     Diabetes     DISH (diffuse idiopathic skeletal hyperostosis)     Exercise counseling     Generalized headaches     Hematuria     Hyperlipidemia     Hypertension     Insomnia     Left leg injury     Left low back pain     Nutritional counseling     Prostatitis     Radiculopathy, cervical     Right knee pain     Sinusitis, acute     Tension headache     Thoracic back pain     Urinary frequency     Wears eyeglasses        Past Surgical History:   Past Surgical History:   Procedure Laterality Date    ANTERIOR CERVICAL DISCECTOMY W/ FUSION N/A 3/21/2017    Procedure: CERVICAL DISCECTOMY  ANTERIOR WITH FUSION WITH INSTRUMENTATION;  Surgeon: Shadi Mckenna MD;  [facility];  Service:     SHOULDER SURGERY Right     times 4       Family History:   Family History   Problem Relation Age of Onset    Stroke Other     Diabetes Other     Hypertension Other     Prostate cancer Other        Social History:   Social History     Socioeconomic History    Marital status:    Tobacco Use    Smoking status: Never     Passive exposure: Never    Smokeless tobacco: Current     Types: Chew   Vaping Use    Vaping Use: Never used   Substance and Sexual Activity    Alcohol use: Yes     Comment: OCC    Drug use: No    Sexual activity: Yes     Partners: Female     Comment: spouse       Immunizations:   Immunization History   Administered Date(s) Administered    COVID-19 (MODERNA) 1st,2nd,3rd Dose Monovalent 12/30/2020, 01/29/2021, 01/14/2022    Fluzone (or Fluarix & Flulaval for VFC) >6mos 10/27/2020    Influenza, Unspecified 10/27/2020    Pneumococcal Conjugate 20-Valent (PCV20) 09/26/2023    Tdap 09/26/2023        Medications:     Current Outpatient Medications:     amLODIPine (NORVASC) 10 MG tablet, Take 1 tablet by mouth Daily., Disp: 30 tablet, Rfl: 5    cetirizine (zyrTEC) 10 MG tablet, Take 1 tablet by mouth Daily., Disp: 30 tablet, Rfl: 2    fluticasone (Flonase) 50 MCG/ACT nasal spray, 2 sprays into the nostril(s) as directed by provider Daily., Disp: 9.9 mL, Rfl: 2    losartan (COZAAR) 100 MG tablet, Take 1 tablet by mouth Daily., Disp: 30 tablet, Rfl: 5    metFORMIN ER (GLUCOPHAGE-XR) 500 MG 24 hr tablet, Take 2 tablets by mouth 2 (Two) Times a Day., Disp: 120 tablet, Rfl: 5    cyclobenzaprine (FLEXERIL) 5 MG tablet, Take 1 tablet by mouth 3 (Three) Times a Day As Needed for Muscle Spasms., Disp: 30 tablet, Rfl: 0    Diclofenac Sodium (VOLTAREN) 1 % gel gel, Apply 4 g topically to the appropriate area as directed 4 (Four) Times a Day As Needed (Left hip pain)., Disp: 50 g, Rfl: 0    predniSONE  "(DELTASONE) 10 MG tablet, Take 8 tablets by mouth Daily for 1 day, THEN 7 tablets Daily for 1 day, THEN 6 tablets Daily for 1 day, THEN 5 tablets Daily for 1 day, THEN 4 tablets Daily for 1 day, THEN 3 tablets Daily for 1 day, THEN 2 tablets Daily for 1 day, THEN 1 tablet Daily for 1 day., Disp: 36 tablet, Rfl: 0    rosuvastatin (Crestor) 40 MG tablet, Take 1 tablet by mouth Daily., Disp: 30 tablet, Rfl: 5    Allergies:   No Known Allergies    Objective     Vital Signs  Vitals:    09/26/23 1024   BP: 126/80   BP Location: Left arm   Patient Position: Sitting   Cuff Size: Large Adult   Pulse: 76   Resp: 16   Temp: 98.4 °F (36.9 °C)   TempSrc: Temporal   SpO2: 96%   Weight: 103 kg (226 lb 12.8 oz)   Height: 182.9 cm (72\")     Estimated body mass index is 30.76 kg/m² as calculated from the following:    Height as of this encounter: 182.9 cm (72\").    Weight as of this encounter: 103 kg (226 lb 12.8 oz).          Physical Exam  Vitals and nursing note reviewed.   Constitutional:       General: He is not in acute distress.     Appearance: Normal appearance. He is not ill-appearing, toxic-appearing or diaphoretic.   HENT:      Head: Normocephalic and atraumatic.   Eyes:      Extraocular Movements: Extraocular movements intact.   Cardiovascular:      Rate and Rhythm: Normal rate and regular rhythm.      Pulses:           Dorsalis pedis pulses are 2+ on the right side and 2+ on the left side.        Posterior tibial pulses are 2+ on the right side and 2+ on the left side.      Heart sounds: No murmur heard.    No friction rub. No gallop.   Pulmonary:      Effort: Pulmonary effort is normal. No respiratory distress.      Breath sounds: No wheezing, rhonchi or rales.   Musculoskeletal:      Cervical back: Normal range of motion.      Thoracic back: No tenderness or bony tenderness.      Lumbar back: No tenderness or bony tenderness. Normal range of motion. Negative right straight leg raise test and negative left straight leg " raise test.      Right hip: No tenderness or bony tenderness. Normal range of motion. Normal strength.      Left hip: Tenderness present. No bony tenderness. Normal range of motion. Normal strength.      Right knee: Normal range of motion.      Left knee: No bony tenderness. Normal range of motion. No tenderness.      Right lower leg: No swelling, tenderness or bony tenderness.      Left lower leg: No swelling, tenderness or bony tenderness.      Right ankle: No swelling. Normal pulse.      Left ankle: No swelling. Normal pulse.      Right foot: Normal range of motion and normal capillary refill. Normal pulse.      Left foot: Normal range of motion and normal capillary refill. Normal pulse.      Comments: Patient does report tenderness to palpation of lateral aspect of left hip and to palpation of gluteal region, near piriformis muscle  Hamstrings and quadricep strength 5 out of 5 bilaterally  Dorsiflexion and plantarflexion strength 5 out of 5 bilaterally  No asymmetric lower extremity swelling, erythema, or warmth to palpation   Feet:      Right foot:      Protective Sensation: 3 sites tested.  3 sites sensed.      Skin integrity: No ulcer, blister, skin breakdown, erythema or callus.      Left foot:      Protective Sensation: 3 sites tested.  3 sites sensed.      Skin integrity: No ulcer, blister, skin breakdown, erythema or callus.   Skin:     Coloration: Skin is not pale.   Neurological:      Mental Status: He is alert and oriented to person, place, and time. Mental status is at baseline.   Psychiatric:         Mood and Affect: Mood normal.         Thought Content: Thought content normal.        Assessment and Plan     1. Chronic left hip pain  -Did discuss possible etiologies of chronic left hip pain: Bursitis, osteoarthritis, tendinitis, inherent muscle instability/weakness, etc.  -Recommended further evaluation with an x-ray of the left hip.  -I have recommended short course of steroids. We did discuss  possible side effects of steroids: Jitteriness, increased hunger, irritability, bloating, increase in blood sugar or blood pressure, decreased ability to sleep. Patient reports they have tolerated steroids well in the past.  -I did also recommend muscle relaxers, as I do believe some of his problems may be due to muscle tightness in gluteal region.  We did discuss that muscle relaxers can make him drowsy, advised that he take this at night prior to bedtime.  Advised that he not drive or operate machinery after taking.  -Prescription for topical Voltaren has also been sent.  We discussed this is a topical NSAID and can help reduce inflammation and pain.  -I have also recommended referral to physical therapy for further evaluation and treatment, as they will be able to identify root of problem and evaluate and treat for inherent muscle weakness, instability, etc.  If he fails trial of physical therapy, may need to refer to orthopedics.  -We will plan on following up in his hip pain at his next appointment in 4 to 6 weeks.  - XR Hip With or Without Pelvis 2 - 3 View Left; Future  - Ambulatory Referral to Physical Therapy Evaluate and treat  - cyclobenzaprine (FLEXERIL) 5 MG tablet; Take 1 tablet by mouth 3 (Three) Times a Day As Needed for Muscle Spasms.  Dispense: 30 tablet; Refill: 0  - Diclofenac Sodium (VOLTAREN) 1 % gel gel; Apply 4 g topically to the appropriate area as directed 4 (Four) Times a Day As Needed (Left hip pain).  Dispense: 50 g; Refill: 0  - predniSONE (DELTASONE) 10 MG tablet; Take 8 tablets by mouth Daily for 1 day, THEN 7 tablets Daily for 1 day, THEN 6 tablets Daily for 1 day, THEN 5 tablets Daily for 1 day, THEN 4 tablets Daily for 1 day, THEN 3 tablets Daily for 1 day, THEN 2 tablets Daily for 1 day, THEN 1 tablet Daily for 1 day.  Dispense: 36 tablet; Refill: 0    2. Routine general medical examination at a health care facility  -He has not had yearly wellness labs since July 2022.  Yearly  health maintenance labs have been ordered.  - CBC (No Diff); Future  - Hepatitis C Antibody; Future  - Vitamin B12; Future  - TSH Rfx On Abnormal To Free T4; Future  - Urinalysis With Culture If Indicated -; Future  - CBC (No Diff)  - Hepatitis C Antibody  - Vitamin B12  - TSH Rfx On Abnormal To Free T4  - Urinalysis With Culture If Indicated -    3. Screening PSA (prostate specific antigen)  -Screening yearly PSA has been ordered.  - PSA Screen; Future    4. Type 2 diabetes mellitus with hyperglycemia, without long-term current use of insulin  -Point care hemoglobin A1c = 5.9.  Patient's blood sugars appear to be doing very well even without taking his metformin as prescribed.  -Refill of his metformin has been sent to the pharmacy.  -CMP and urine microalbumin have also been ordered for further evaluation.  -Advised him to continue with low carbohydrate diet.  -We did discuss that due to his comorbidity of diabetes, he is eligible for early pneumonia vaccination.  We discussed risks and benefits of vaccination.  He is agreeable to vaccination and did receive this in the office today.  -Diabetic foot exam was performed today and was within normal limits.  He is up-to-date on diabetic eye exam.  - Comprehensive Metabolic Panel; Future  - POC Glycosylated Hemoglobin (Hb A1C)  - Comprehensive Metabolic Panel  - MicroAlbumin, Urine, Random - Urine, Clean Catch; Future  - Pneumococcal Conjugate Vaccine 20-Valent (PCV20)  - metFORMIN ER (GLUCOPHAGE-XR) 500 MG 24 hr tablet; Take 2 tablets by mouth 2 (Two) Times a Day.  Dispense: 120 tablet; Refill: 5    5. Primary hypertension  -Blood pressure is doing well in office today.  He denies any intolerance to medications.  Refill of his medications have been sent to pharmacy.  - amLODIPine (NORVASC) 10 MG tablet; Take 1 tablet by mouth Daily.  Dispense: 30 tablet; Refill: 5  - losartan (COZAAR) 100 MG tablet; Take 1 tablet by mouth Daily.  Dispense: 30 tablet; Refill: 5    6.  Mixed hyperlipidemia  -We will evaluate to ensure that his lipid panel is staying at goal for diabetic patient.  Refill of his Crestor has been sent to the pharmacy.  - Lipid Panel; Future  - Lipid Panel  - rosuvastatin (Crestor) 40 MG tablet; Take 1 tablet by mouth Daily.  Dispense: 30 tablet; Refill: 5    7. Need for Tdap vaccination  -It has been greater than 10 years since his last Tdap vaccination.  Discussed he is eligible for Tdap vaccination today.  Discussed risks and benefits and purpose of vaccination.  He is agreeable to vaccination today.  - Tdap Vaccine Greater Than or Equal To 6yo IM    8. Need for shingles vaccine  -Discussed that because he is older than 50, he is eligible for shingles vaccination.  Discussed purpose of vaccination and risk/benefits.  We do not carry shingles vaccination in the office but he is encouraged to inquire about this at local pharmacy.       Follow Up  Return in about 6 weeks (around 11/7/2023) for Annual physical.    MEKHI Metcalf

## 2023-09-27 ENCOUNTER — TELEPHONE (OUTPATIENT)
Dept: FAMILY MEDICINE CLINIC | Facility: CLINIC | Age: 58
End: 2023-09-27
Payer: COMMERCIAL

## 2023-09-29 ENCOUNTER — LAB (OUTPATIENT)
Dept: FAMILY MEDICINE CLINIC | Facility: CLINIC | Age: 58
End: 2023-09-29
Payer: COMMERCIAL

## 2023-09-29 DIAGNOSIS — E11.65 TYPE 2 DIABETES MELLITUS WITH HYPERGLYCEMIA, WITHOUT LONG-TERM CURRENT USE OF INSULIN: ICD-10-CM

## 2023-09-29 DIAGNOSIS — Z12.5 SCREENING PSA (PROSTATE SPECIFIC ANTIGEN): ICD-10-CM

## 2023-09-29 LAB
ALBUMIN SERPL-MCNC: 4.5 G/DL (ref 3.5–5.2)
ALBUMIN/GLOB SERPL: 1.7 G/DL
ALP SERPL-CCNC: 60 U/L (ref 39–117)
ALT SERPL W P-5'-P-CCNC: 25 U/L (ref 1–41)
ANION GAP SERPL CALCULATED.3IONS-SCNC: 13 MMOL/L (ref 5–15)
AST SERPL-CCNC: 18 U/L (ref 1–40)
BILIRUB SERPL-MCNC: 0.6 MG/DL (ref 0–1.2)
BILIRUB UR QL STRIP: NEGATIVE
BUN SERPL-MCNC: 19 MG/DL (ref 6–20)
BUN/CREAT SERPL: 15.4 (ref 7–25)
CALCIUM SPEC-SCNC: 9.2 MG/DL (ref 8.6–10.5)
CHLORIDE SERPL-SCNC: 100 MMOL/L (ref 98–107)
CHOLEST SERPL-MCNC: 144 MG/DL (ref 0–200)
CLARITY UR: CLEAR
CO2 SERPL-SCNC: 23 MMOL/L (ref 22–29)
COLOR UR: YELLOW
CREAT SERPL-MCNC: 1.23 MG/DL (ref 0.76–1.27)
DEPRECATED RDW RBC AUTO: 38.9 FL (ref 37–54)
EGFRCR SERPLBLD CKD-EPI 2021: 68.9 ML/MIN/1.73
ERYTHROCYTE [DISTWIDTH] IN BLOOD BY AUTOMATED COUNT: 13.7 % (ref 12.3–15.4)
GLOBULIN UR ELPH-MCNC: 2.7 GM/DL
GLUCOSE SERPL-MCNC: 138 MG/DL (ref 65–99)
GLUCOSE UR STRIP-MCNC: NEGATIVE MG/DL
HCT VFR BLD AUTO: 44.1 % (ref 37.5–51)
HCV AB SER DONR QL: NORMAL
HDLC SERPL-MCNC: 53 MG/DL (ref 40–60)
HGB BLD-MCNC: 14.6 G/DL (ref 13–17.7)
HGB UR QL STRIP.AUTO: NEGATIVE
KETONES UR QL STRIP: NEGATIVE
LDLC SERPL CALC-MCNC: 77 MG/DL (ref 0–100)
LDLC/HDLC SERPL: 1.46 {RATIO}
LEUKOCYTE ESTERASE UR QL STRIP.AUTO: NEGATIVE
MCH RBC QN AUTO: 26.2 PG (ref 26.6–33)
MCHC RBC AUTO-ENTMCNC: 33.1 G/DL (ref 31.5–35.7)
MCV RBC AUTO: 79 FL (ref 79–97)
NITRITE UR QL STRIP: NEGATIVE
PH UR STRIP.AUTO: 5.5 [PH] (ref 5–8)
PLATELET # BLD AUTO: 302 10*3/MM3 (ref 140–450)
PMV BLD AUTO: 9.7 FL (ref 6–12)
POTASSIUM SERPL-SCNC: 4.1 MMOL/L (ref 3.5–5.2)
PROT SERPL-MCNC: 7.2 G/DL (ref 6–8.5)
PROT UR QL STRIP: NEGATIVE
PSA SERPL-MCNC: 1.45 NG/ML (ref 0–4)
RBC # BLD AUTO: 5.58 10*6/MM3 (ref 4.14–5.8)
SODIUM SERPL-SCNC: 136 MMOL/L (ref 136–145)
SP GR UR STRIP: 1.02 (ref 1–1.03)
TRIGL SERPL-MCNC: 69 MG/DL (ref 0–150)
TSH SERPL DL<=0.05 MIU/L-ACNC: 1.69 UIU/ML (ref 0.27–4.2)
UROBILINOGEN UR QL STRIP: NORMAL
VIT B12 BLD-MCNC: 520 PG/ML (ref 211–946)
VLDLC SERPL-MCNC: 14 MG/DL (ref 5–40)
WBC NRBC COR # BLD: 8.08 10*3/MM3 (ref 3.4–10.8)

## 2023-09-29 PROCEDURE — 82043 UR ALBUMIN QUANTITATIVE: CPT

## 2023-09-29 PROCEDURE — G0103 PSA SCREENING: HCPCS

## 2023-09-29 PROCEDURE — 80053 COMPREHEN METABOLIC PANEL: CPT

## 2023-09-29 PROCEDURE — 80061 LIPID PANEL: CPT

## 2023-09-29 PROCEDURE — 84443 ASSAY THYROID STIM HORMONE: CPT

## 2023-09-29 PROCEDURE — 36415 COLL VENOUS BLD VENIPUNCTURE: CPT | Performed by: FAMILY MEDICINE

## 2023-09-29 PROCEDURE — 85027 COMPLETE CBC AUTOMATED: CPT

## 2023-09-29 PROCEDURE — 81003 URINALYSIS AUTO W/O SCOPE: CPT

## 2023-09-29 PROCEDURE — 86803 HEPATITIS C AB TEST: CPT

## 2023-09-29 PROCEDURE — 82607 VITAMIN B-12: CPT

## 2023-09-30 LAB
ALBUMIN UR-MCNC: 1.9 MG/DL
HOLD SPECIMEN: NORMAL

## 2023-10-02 ENCOUNTER — TELEPHONE (OUTPATIENT)
Dept: FAMILY MEDICINE CLINIC | Facility: CLINIC | Age: 58
End: 2023-10-02
Payer: COMMERCIAL

## 2023-10-02 NOTE — TELEPHONE ENCOUNTER
Caller: Kelton Escamilla    Relationship: Self    Best call back number: [phone]     What is the best time to reach you: ANY     Who are you requesting to speak with (clinical staff, provider,  specific staff member): WHOMEVER CALLED    Do you know the name of the person who called: KELTON    What was the call regarding: UNSURE. POSSIBLY ABOUT TEST RESULTS

## 2023-10-02 NOTE — TELEPHONE ENCOUNTER
----- Message from Hanh Gunter PA-C sent at 10/2/2023  8:35 AM EDT -----  Urine microalbumin is within normal range.  PSA screen is also within normal range.

## 2023-11-06 ENCOUNTER — OFFICE VISIT (OUTPATIENT)
Dept: FAMILY MEDICINE CLINIC | Facility: CLINIC | Age: 58
End: 2023-11-06
Payer: COMMERCIAL

## 2023-11-06 VITALS
RESPIRATION RATE: 16 BRPM | TEMPERATURE: 98 F | BODY MASS INDEX: 31.8 KG/M2 | DIASTOLIC BLOOD PRESSURE: 80 MMHG | OXYGEN SATURATION: 98 % | HEIGHT: 72 IN | SYSTOLIC BLOOD PRESSURE: 120 MMHG | HEART RATE: 81 BPM | WEIGHT: 234.8 LBS

## 2023-11-06 DIAGNOSIS — Z23 NEED FOR INFLUENZA VACCINATION: ICD-10-CM

## 2023-11-06 DIAGNOSIS — E11.65 TYPE 2 DIABETES MELLITUS WITH HYPERGLYCEMIA, WITHOUT LONG-TERM CURRENT USE OF INSULIN: ICD-10-CM

## 2023-11-06 DIAGNOSIS — Z00.00 WELL ADULT EXAM: Primary | ICD-10-CM

## 2023-11-06 DIAGNOSIS — E66.09 CLASS 1 OBESITY DUE TO EXCESS CALORIES WITH SERIOUS COMORBIDITY AND BODY MASS INDEX (BMI) OF 31.0 TO 31.9 IN ADULT: ICD-10-CM

## 2023-11-06 DIAGNOSIS — E78.2 MIXED HYPERLIPIDEMIA: ICD-10-CM

## 2023-11-06 DIAGNOSIS — Z23 NEED FOR ZOSTER VACCINATION: ICD-10-CM

## 2023-11-06 DIAGNOSIS — I10 PRIMARY HYPERTENSION: ICD-10-CM

## 2023-11-06 NOTE — PROGRESS NOTES
Male Physical Note      Date: 2023   Patient Name: Kelton Escamilla  : 1965   MRN: [id number]     Chief Complaint:    Chief Complaint   Patient presents with    Annual Exam       History of Present Illness: Kelton Escamilla is a 57 y.o. male who is here today for their annual health maintenance and physical.  Patient has been doing relatively well since last being seen with time problems noted.  He does continue to take his medication as prescribed without complaints.  He had recent laboratory data that did not reveal too much abnormality.  Patient states that he has tolerated his medications without side effects.  Patient has continued to do activity, appetite and sleep.  He denies any other cardiovascular, respiratory, gastrointestinal, urologic or neurologic complaints.      Subjective      Review of Systems:   Review of Systems   Constitutional:  Negative for activity change, appetite change and fatigue.   Respiratory:  Negative for cough and shortness of breath.    Cardiovascular:  Negative for chest pain, palpitations and leg swelling.   Gastrointestinal:  Negative for abdominal distention, abdominal pain, blood in stool, constipation, diarrhea, nausea, vomiting, GERD and indigestion.   Genitourinary:  Negative for dysuria, flank pain, frequency and urgency.   Musculoskeletal:  Negative for arthralgias and myalgias.   Neurological:  Negative for dizziness, tremors, seizures, weakness, light-headedness, numbness, headache, memory problem and confusion.   Psychiatric/Behavioral:  Negative for sleep disturbance and depressed mood. The patient is not nervous/anxious.        Past Medical History, Social History, Family History and Care Team were all reviewed with patient and updated as appropriate.     Medications:     Current Outpatient Medications:     amLODIPine (NORVASC) 10 MG tablet, Take 1 tablet by mouth Daily., Disp: 30 tablet, Rfl: 5    cetirizine (zyrTEC) 10 MG tablet, Take 1 tablet by  mouth Daily., Disp: 30 tablet, Rfl: 2    cyclobenzaprine (FLEXERIL) 5 MG tablet, Take 1 tablet by mouth 3 (Three) Times a Day As Needed for Muscle Spasms., Disp: 30 tablet, Rfl: 0    losartan (COZAAR) 100 MG tablet, Take 1 tablet by mouth Daily., Disp: 30 tablet, Rfl: 5    metFORMIN ER (GLUCOPHAGE-XR) 500 MG 24 hr tablet, Take 2 tablets by mouth 2 (Two) Times a Day. (Patient taking differently: Take 2 tablets by mouth 2 (Two) Times a Day. Taking randomly.), Disp: 120 tablet, Rfl: 5    rosuvastatin (Crestor) 40 MG tablet, Take 1 tablet by mouth Daily. (Patient taking differently: Take 1 tablet by mouth Daily. Not taking regular.), Disp: 30 tablet, Rfl: 5    Diclofenac Sodium (VOLTAREN) 1 % gel gel, Apply 4 g topically to the appropriate area as directed 4 (Four) Times a Day As Needed (Left hip pain)., Disp: 50 g, Rfl: 0    Zoster Vac Recomb Adjuvanted 50 MCG/0.5ML reconstituted suspension, Inject 0.5 mL into the appropriate muscle as directed by prescriber 1 (One) Time for 1 dose., Disp: 1 each, Rfl: 0    Allergies:   No Known Allergies    Immunizations:  Health Maintenance Summary            Overdue - Hepatitis B (1 of 3 - 3-dose series) Never done      No completion, postpone, or frequency change history exists for this topic.              Ordered - ZOSTER VACCINE (1 of 2) Ordered on 11/6/2023      No completion, postpone, or frequency change history exists for this topic.              Postponed - COVID-19 Vaccine (4 - 2023-24 season) Postponed until 12/12/2112 11/06/2023  Postponed until 12/12/2112 by Ángel Lopez MD (Patient Refused)    01/14/2022  Imm Admin: COVID-19 (MODERNA) 1st,2nd,3rd Dose Monovalent    01/29/2021  Imm Admin: COVID-19 (MODERNA) 1st,2nd,3rd Dose Monovalent    12/30/2020  Imm Admin: COVID-19 (MODERNA) 1st,2nd,3rd Dose Monovalent              HEMOGLOBIN A1C (Every 6 Months) Next due on 3/26/2024      09/26/2023  Hemoglobin A1C component of POC Glycosylated Hemoglobin (Hb  A1C)    07/06/2022  Done    03/16/2017  Hemoglobin A1C component of Hemoglobin A1c              DIABETIC EYE EXAM (Yearly) Next due on 9/20/2024 09/20/2023  Patient-Reported (Performed Externally) - in [place]              DIABETIC FOOT EXAM (Yearly) Next due on 9/26/2024 09/26/2023  Done              BMI FOLLOWUP (Yearly) Next due on 9/26/2024 09/26/2023  Registry Metric: BMI Follow-up              LIPID PANEL (Yearly) Next due on 9/29/2024 09/29/2023  Lipid Panel    07/06/2022  Done              URINE MICROALBUMIN (Yearly) Next due on 9/29/2024 09/29/2023  Microalbumin, Urine component of MicroAlbumin, Urine, Random - Urine, Clean Catch    03/02/2022  Done              ANNUAL PHYSICAL (Yearly) Next due on 11/6/2024 11/06/2023  Done    02/11/2022  Done              COLORECTAL CANCER SCREENING (COLONOSCOPY - Every 5 Years) Next due on 2/18/2025 11/06/2023  Follow-up changed to COLONOSCOPY - Every 5 Years by Ángel Lopez MD    02/18/2020   COLONOSCOPY              TDAP/TD VACCINES (2 - Td or Tdap) Next due on 9/26/2033 09/26/2023  Imm Admin: Tdap              Pneumococcal Vaccine 0-64 (Series Information) Completed      09/26/2023  Imm Admin: Pneumococcal Conjugate 20-Valent (PCV20)              HEPATITIS C SCREENING  Completed      09/29/2023  Hepatitis C Antibody              INFLUENZA VACCINE  Completed      11/06/2023  Imm Admin: Fluzone (or Fluarix & Flulaval for VFC) >6mos    10/27/2020  Imm Admin: Influenza, Unspecified    10/27/2020  Imm Admin: Fluzone (or Fluarix & Flulaval for VFC) >6mos                    Orders Placed This Encounter   Procedures    Fluzone (or Fluarix & Flulaval for VFC) >6mos       Colorectal Screening:   Up-to-date on his colonoscopy.  He will need a repeat colonoscopy in February 2025.  Last Completed Colonoscopy            COLORECTAL CANCER SCREENING (COLONOSCOPY - Every 5 Years) Next due on 2/18/2025 02/18/2020    "COLONOSCOPY                  CT for Smoker (Age 50-80, 20pk yr within last 15 years): Deferred.  Bone Density/DEXA (high risk): Deferred.  Hep C (Age 18-79 once): Previously ordered.  HIV (Age 15-65 once) : Deferred.  PSA (Over age 50, C Level Recommendation): Previously ordered.  US Aorta (For male smokers, age 65): Deferred.  A1c:   Hemoglobin A1C   Date Value Ref Range Status   09/26/2023 5.9 % Final   03/16/2017 6.50 (H) 4.80 - 5.60 % Final     Lipid panel: No results found for: \"LABLIPI\"    The 10-year ASCVD risk score (Julio GARCIA, et al., 2019) is: 16.6%    Values used to calculate the score:      Age: 57 years      Sex: Male      Is Non- : Yes      Diabetic: Yes      Tobacco smoker: No      Systolic Blood Pressure: 120 mmHg      Is BP treated: Yes      HDL Cholesterol: 53 mg/dL      Total Cholesterol: 144 mg/dL    Dermatology: Advise if necessary.  Ophthalmologist: Up-to-date.  Dentist: Up-to-date.    Tobacco Use: High Risk (11/6/2023)    Patient History     Smoking Tobacco Use: Never     Smokeless Tobacco Use: Current     Passive Exposure: Never       Social History     Substance and Sexual Activity   Alcohol Use Yes    Comment: OCC        Social History     Substance and Sexual Activity   Drug Use No        Diet/Physical activity: Well-balanced diet/daily exercise.    Sexual health: No issues at present time.    PHQ-2 Depression Screening  PHQ-9 Total Score: 0  0    Measures:   Advanced Care Planning:   Patient does not have an advance directive, information provided.    Smoking Cessation:   Non-smoker.     Objective     Physical Exam:  Vital Signs:   Vitals:    11/06/23 0951   BP: 120/80   BP Location: Left arm   Patient Position: Sitting   Cuff Size: Adult   Pulse: 81   Resp: 16   Temp: 98 °F (36.7 °C)   TempSrc: Temporal   SpO2: 98%   Weight: 107 kg (234 lb 12.8 oz)   Height: 182.9 cm (72\")     Body mass index is 31.84 kg/m².     Physical Exam  Vitals and nursing note reviewed. "   Constitutional:       Appearance: Normal appearance.   HENT:      Head: Normocephalic and atraumatic.      Nose: Nose normal.      Mouth/Throat:      Pharynx: Oropharynx is clear.   Eyes:      Extraocular Movements: Extraocular movements intact.      Pupils: Pupils are equal, round, and reactive to light.   Neck:      Thyroid: No thyroid mass or thyromegaly.      Trachea: Trachea normal.   Cardiovascular:      Rate and Rhythm: Normal rate and regular rhythm.      Pulses: Normal pulses. No decreased pulses.      Heart sounds: Normal heart sounds.   Pulmonary:      Effort: Pulmonary effort is normal.      Breath sounds: Normal breath sounds.   Abdominal:      General: Abdomen is flat. Bowel sounds are normal.      Palpations: Abdomen is soft.      Tenderness: There is no abdominal tenderness.   Musculoskeletal:      Cervical back: Neck supple.      Right lower leg: No edema.      Left lower leg: No edema.   Lymphadenopathy:      Cervical: No cervical adenopathy.   Skin:     General: Skin is warm and dry.   Neurological:      General: No focal deficit present.      Mental Status: He is alert and oriented to person, place, and time.      Sensory: Sensation is intact.      Motor: Motor function is intact.      Coordination: Coordination is intact.   Psychiatric:         Attention and Perception: Attention normal.         Mood and Affect: Mood normal.         Speech: Speech normal.         Behavior: Behavior normal.          POCT Results (if applicable):   Results for orders placed or performed in visit on 09/29/23   PSA Screen    Specimen: Arm, Left; Blood   Result Value Ref Range    PSA 1.450 0.000 - 4.000 ng/mL   MicroAlbumin, Urine, Random - Urine, Clean Catch    Specimen: Urine, Clean Catch   Result Value Ref Range    Microalbumin, Urine 1.9 mg/dL       Procedures    Assessment / Plan      Assessment/Plan:   Diagnoses and all orders for this visit:    1. Well adult exam (Primary)   Patient did have a wellness exam  performed today that did not reveal any abnormality.  He did well with respect to his depression and anxiety questionnaire.  We did discuss anticipatory guidance as well as safety concerns.  We will continue with his current regiment and will treat accordingly.  If he has other problems before does not follow contact    2. Type 2 diabetes mellitus with hyperglycemia, without long-term current use of insulin   Patient has been doing well with respect to his diabetes at present time.  His most recent hemoglobin A1c was acceptable.  We will continue to advise him to take the metformin on a daily basis and to continue to watch his diet etc. to monitor and reportedly.  We have discussed the option of obtaining hepatitis B vaccine.  We will plan on obtaining a hepatitis B surface antibody in the near future to see if he is already immune.    3. Primary hypertension   Blood pressures been doing well at present time.  He has tolerated the medication without difficulty.  No adjustments are necessary at present time.    4. Mixed hyperlipidemia   Recent laboratory data ordered by another provider was reviewed.  It does appear that his LDL did have a slightly increased and level greater than 70.  We have encouraged regular monitoring schedule.  We may need further adjustment in the future to keep his LDL less than 70.    5. Need for zoster vaccination  We have discussed the risk and benefits of the shingle vaccine.  We will send a request to the pharmacy.  -     Zoster Vac Recomb Adjuvanted 50 MCG/0.5ML reconstituted suspension; Inject 0.5 mL into the appropriate muscle as directed by prescriber 1 (One) Time for 1 dose.  Dispense: 1 each; Refill: 0    6. Need for influenza vaccination  Patient did receive a flu shot today.  -     Fluzone (or Fluarix & Flulaval for VFC) >6mos    7.  Class I obesity with serious comorbidities   Patient does have increase in his BMI.  He will continue with his diet and exercise regimen.  If his  hemoglobin A1c becomes elevated we will we may consider the initiation of the B1 agonist.    Healthcare Maintenance:  Counseling provided based on age appropriate USPSTF guidelines.       Kelton Escamilla voices understanding and acceptance of this advice and will call back with any further questions or concerns. AVS with preventive healthcare tips printed for patient.     Follow Up:   Return in about 6 months (around 5/6/2024).    At [facility], we believe that sharing information builds trust and better relationships. You are receiving this note because you recently visited [facility]. It is possible you will see health information before a provider has talked with you about it. This kind of information can be easy to misunderstand. To help you fully understand what it means for your health, we urge you to discuss this note with your provider.    Ángel Lopez MD  [facility]

## 2023-11-06 NOTE — PATIENT INSTRUCTIONS
Advance Care Planning and Advance Directives     You make decisions on a daily basis - decisions about where you want to live, your career, your home, your life. Perhaps one of the most important decisions you face is your choice for future medical care. Take time to talk with your family and your healthcare team and start planning today.  Advance Care Planning is a process that can help you:  Understand possible future healthcare decisions in light of your own experiences  Reflect on those decision in light of your goals and values  Discuss your decisions with those closest to you and the healthcare professionals that care for you  Make a plan by creating a document that reflects your wishes    Surrogate Decision Maker  In the event of a medical emergency, which has left you unable to communicate or to make your own decisions, you would need someone to make decisions for you.  It is important to discuss your preferences for medical treatment with this person while you are in good health.     Qualities of a surrogate decision maker:  Willing to take on this role and responsibility  Knows what you want for future medical care  Willing to follow your wishes even if they don't agree with them  Able to make difficult medical decisions under stressful circumstances    Advance Directives  These are legal documents you can create that will guide your healthcare team and decision maker(s) when needed. These documents can be stored in the electronic medical record.    Living Will - a legal document to guide your care if you have a terminal condition or a serious illness and are unable to communicate. States vary by statute in document names/types, but most forms may include one or more of the following:        -  Directions regarding life-prolonging treatments        -  Directions regarding artificially provided nutrition/hydration        -  Choosing a healthcare decision maker        -  Direction regarding organ/tissue  donation    Durable Power of  for Healthcare - this document names an -in-fact to make medical decisions for you, but it may also allow this person to make personal and financial decisions for you. Please seek the advice of an  if you need this type of document.    **Advance Directives are not required and no one may discriminate against you if you do not sign one.    Medical Orders  Many states allow specific forms/orders signed by your physician to record your wishes for medical treatment in your current state of health. This form, signed in personal communication with your physician, addresses resuscitation and other medical interventions that you may or may not want.      For more information or to schedule a time with a [facility] Advance Care Planning Facilitator contact: [email or web address] or call [phone] and someone will contact you directly.

## 2023-12-04 ENCOUNTER — OFFICE VISIT (OUTPATIENT)
Dept: FAMILY MEDICINE CLINIC | Facility: CLINIC | Age: 58
End: 2023-12-04
Payer: COMMERCIAL

## 2023-12-04 VITALS
HEIGHT: 72 IN | BODY MASS INDEX: 31.97 KG/M2 | DIASTOLIC BLOOD PRESSURE: 78 MMHG | OXYGEN SATURATION: 97 % | WEIGHT: 236 LBS | RESPIRATION RATE: 18 BRPM | HEART RATE: 65 BPM | SYSTOLIC BLOOD PRESSURE: 124 MMHG | TEMPERATURE: 98 F

## 2023-12-04 DIAGNOSIS — G89.29 CHRONIC LEFT HIP PAIN: ICD-10-CM

## 2023-12-04 DIAGNOSIS — M25.552 CHRONIC LEFT HIP PAIN: ICD-10-CM

## 2023-12-04 DIAGNOSIS — R35.0 URINARY FREQUENCY: ICD-10-CM

## 2023-12-04 DIAGNOSIS — M54.40 ACUTE MIDLINE LOW BACK PAIN WITH SCIATICA, SCIATICA LATERALITY UNSPECIFIED: Primary | ICD-10-CM

## 2023-12-04 LAB
BILIRUB BLD-MCNC: ABNORMAL MG/DL
CLARITY, POC: CLEAR
COLOR UR: ABNORMAL
GLUCOSE UR STRIP-MCNC: NEGATIVE MG/DL
KETONES UR QL: NEGATIVE
LEUKOCYTE EST, POC: NEGATIVE
NITRITE UR-MCNC: NEGATIVE MG/ML
PH UR: 6 [PH] (ref 5–8)
PROT UR STRIP-MCNC: ABNORMAL MG/DL
RBC # UR STRIP: ABNORMAL /UL
SP GR UR: 1.03 (ref 1–1.03)
UROBILINOGEN UR QL: ABNORMAL

## 2023-12-04 RX ORDER — CYCLOBENZAPRINE HCL 5 MG
10 TABLET ORAL 3 TIMES DAILY PRN
Qty: 30 TABLET | Refills: 2 | Status: SHIPPED | OUTPATIENT
Start: 2023-12-04

## 2023-12-06 NOTE — PROGRESS NOTES
Follow Up Office Visit      Date: 2023   Patient Name: Kelton Escamilla  : 1965   MRN: [id number]     Chief Complaint:    Chief Complaint   Patient presents with    Back Pain     Lower back pain   States that he feels the pain all the time, hard for him to stand straight and feels it when he is sitting down as well        History of Present Illness: Kelton Escamilla is a 57 y.o. male who is here today for acute onset  lower back pain.  Symptoms began suddenly after going to the [employer] football game and sitting in the cold bleachers on Friday.  He woke up Saturday morning with aching pain in his hips and low back with stiffness making it difficult for him to stand.  The symptoms have not resolved over the past 2 days so he felt he should be evaluated.  He states that sometimes he has a little bit of pain in his anterior right thigh but has had no genitourinary or GI symptoms.  He has had no fever or chills.  He has known cervical disc disease but that is status quo.  No one else at home has been ill.  He denies any rashes.    Subjective      Review of Systems:   Review of Systems   Constitutional: Negative.    HENT: Negative.     Respiratory: Negative.     Cardiovascular: Negative.    Musculoskeletal:  Positive for arthralgias, back pain, myalgias and neck pain. Negative for gait problem, joint swelling and neck stiffness.   Neurological: Negative.        I have reviewed the patients family history, social history, past medical history, past surgical history and have updated it as appropriate.     Medications:     Current Outpatient Medications:     amLODIPine (NORVASC) 10 MG tablet, Take 1 tablet by mouth Daily., Disp: 30 tablet, Rfl: 5    cetirizine (zyrTEC) 10 MG tablet, Take 1 tablet by mouth Daily., Disp: 30 tablet, Rfl: 2    cyclobenzaprine (FLEXERIL) 5 MG tablet, Take 2 tablets by mouth 3 (Three) Times a Day As Needed for Muscle Spasms., Disp: 30 tablet, Rfl: 2    Diclofenac Sodium  "(VOLTAREN) 1 % gel gel, Apply 4 g topically to the appropriate area as directed 4 (Four) Times a Day As Needed (Left hip pain)., Disp: 50 g, Rfl: 0    losartan (COZAAR) 100 MG tablet, Take 1 tablet by mouth Daily., Disp: 30 tablet, Rfl: 5    metFORMIN ER (GLUCOPHAGE-XR) 500 MG 24 hr tablet, Take 2 tablets by mouth 2 (Two) Times a Day. (Patient taking differently: Take 2 tablets by mouth 2 (Two) Times a Day. Taking randomly.), Disp: 120 tablet, Rfl: 5    rosuvastatin (Crestor) 40 MG tablet, Take 1 tablet by mouth Daily. (Patient taking differently: Take 1 tablet by mouth Daily. Not taking regular.), Disp: 30 tablet, Rfl: 5    Allergies:   No Known Allergies    Objective     Physical Exam: Please see above  Vital Signs:   Vitals:    12/04/23 1614   BP: 124/78   BP Location: Right arm   Patient Position: Sitting   Cuff Size: Adult   Pulse: 65   Resp: 18   Temp: 98 °F (36.7 °C)   TempSrc: Temporal   SpO2: 97%   Weight: 107 kg (236 lb)   Height: 182.9 cm (72.01\")     Body mass index is 32 kg/m².    Physical Exam  Vitals and nursing note reviewed.   Constitutional:       General: He is not in acute distress.     Appearance: Normal appearance. He is not ill-appearing or toxic-appearing.   Musculoskeletal:      Lumbar back: Spasms and tenderness present. No swelling or deformity. Decreased range of motion. Negative right straight leg raise test and negative left straight leg raise test.      Comments: Laying with knees elevated improves pain.   Neurological:      Mental Status: He is alert.       Procedures    Assessment / Plan      Assessment/Plan:    Diagnosis Plan   1. Acute midline low back pain with sciatica, sciatica laterality unspecified  cyclobenzaprine (FLEXERIL) 5 MG tablet      2. Urinary frequency  POC Urinalysis Dipstick      3. Chronic left hip pain  cyclobenzaprine (FLEXERIL) 5 MG tablet              Follow Up:   No follow-ups on file.      At [facility], we believe that sharing information builds trust " and better relationships. You are receiving this note because [facility]. It is possible you will see health information before a provider has talked with you about it. This kind of information can be easy to misunderstand. To help you fully understand what it means for your health, we urge you to discuss this note with your provider.    Jinny Lopez PA-C  [facility]

## 2024-05-10 ENCOUNTER — LAB (OUTPATIENT)
Dept: FAMILY MEDICINE CLINIC | Facility: CLINIC | Age: 59
End: 2024-05-10
Payer: COMMERCIAL

## 2024-05-10 ENCOUNTER — OFFICE VISIT (OUTPATIENT)
Dept: FAMILY MEDICINE CLINIC | Facility: CLINIC | Age: 59
End: 2024-05-10
Payer: COMMERCIAL

## 2024-05-10 VITALS
RESPIRATION RATE: 16 BRPM | DIASTOLIC BLOOD PRESSURE: 90 MMHG | OXYGEN SATURATION: 96 % | HEART RATE: 82 BPM | SYSTOLIC BLOOD PRESSURE: 142 MMHG | BODY MASS INDEX: 33.13 KG/M2 | WEIGHT: 244.6 LBS | TEMPERATURE: 98.4 F | HEIGHT: 72 IN

## 2024-05-10 DIAGNOSIS — E11.9 TYPE 2 DIABETES MELLITUS WITHOUT COMPLICATION, WITHOUT LONG-TERM CURRENT USE OF INSULIN: ICD-10-CM

## 2024-05-10 DIAGNOSIS — M25.512 CHRONIC LEFT SHOULDER PAIN: ICD-10-CM

## 2024-05-10 DIAGNOSIS — E11.9 TYPE 2 DIABETES MELLITUS WITHOUT COMPLICATION, WITHOUT LONG-TERM CURRENT USE OF INSULIN: Primary | ICD-10-CM

## 2024-05-10 DIAGNOSIS — I10 PRIMARY HYPERTENSION: ICD-10-CM

## 2024-05-10 DIAGNOSIS — Z23 NEED FOR ZOSTER VACCINATION: ICD-10-CM

## 2024-05-10 DIAGNOSIS — G47.00 INSOMNIA, UNSPECIFIED TYPE: ICD-10-CM

## 2024-05-10 DIAGNOSIS — G89.29 CHRONIC LEFT SHOULDER PAIN: ICD-10-CM

## 2024-05-10 LAB
ALBUMIN SERPL-MCNC: 4.5 G/DL (ref 3.5–5.2)
ALBUMIN/GLOB SERPL: 1.7 G/DL
ALP SERPL-CCNC: 69 U/L (ref 39–117)
ALT SERPL W P-5'-P-CCNC: 25 U/L (ref 1–41)
ANION GAP SERPL CALCULATED.3IONS-SCNC: 13.5 MMOL/L (ref 5–15)
AST SERPL-CCNC: 15 U/L (ref 1–40)
BILIRUB SERPL-MCNC: 1 MG/DL (ref 0–1.2)
BUN SERPL-MCNC: 16 MG/DL (ref 6–20)
BUN/CREAT SERPL: 14.2 (ref 7–25)
CALCIUM SPEC-SCNC: 9.4 MG/DL (ref 8.6–10.5)
CHLORIDE SERPL-SCNC: 102 MMOL/L (ref 98–107)
CHOLEST SERPL-MCNC: 146 MG/DL (ref 0–200)
CO2 SERPL-SCNC: 23.5 MMOL/L (ref 22–29)
CREAT SERPL-MCNC: 1.13 MG/DL (ref 0.76–1.27)
EGFRCR SERPLBLD CKD-EPI 2021: 75.8 ML/MIN/1.73
GLOBULIN UR ELPH-MCNC: 2.7 GM/DL
GLUCOSE SERPL-MCNC: 124 MG/DL (ref 65–99)
HBA1C MFR BLD: 6.4 % (ref 4.8–5.6)
HDLC SERPL-MCNC: 46 MG/DL (ref 40–60)
LDLC SERPL CALC-MCNC: 87 MG/DL (ref 0–100)
LDLC/HDLC SERPL: 1.9 {RATIO}
POTASSIUM SERPL-SCNC: 4.2 MMOL/L (ref 3.5–5.2)
PROT SERPL-MCNC: 7.2 G/DL (ref 6–8.5)
SODIUM SERPL-SCNC: 139 MMOL/L (ref 136–145)
TRIGL SERPL-MCNC: 64 MG/DL (ref 0–150)
VLDLC SERPL-MCNC: 13 MG/DL (ref 5–40)

## 2024-05-10 PROCEDURE — 99215 OFFICE O/P EST HI 40 MIN: CPT

## 2024-05-10 PROCEDURE — 83036 HEMOGLOBIN GLYCOSYLATED A1C: CPT

## 2024-05-10 PROCEDURE — 36415 COLL VENOUS BLD VENIPUNCTURE: CPT | Performed by: FAMILY MEDICINE

## 2024-05-10 PROCEDURE — 80053 COMPREHEN METABOLIC PANEL: CPT

## 2024-05-10 PROCEDURE — 80061 LIPID PANEL: CPT

## 2024-05-10 RX ORDER — PHENOL 1.4 %
10 AEROSOL, SPRAY (ML) MUCOUS MEMBRANE NIGHTLY
Qty: 30 TABLET | Refills: 5 | Status: SHIPPED | OUTPATIENT
Start: 2024-05-10

## 2024-07-24 ENCOUNTER — OFFICE VISIT (OUTPATIENT)
Dept: SURGERY | Facility: CLINIC | Age: 59
End: 2024-07-24
Payer: COMMERCIAL

## 2024-07-24 ENCOUNTER — LAB (OUTPATIENT)
Dept: FAMILY MEDICINE CLINIC | Facility: CLINIC | Age: 59
End: 2024-07-24
Payer: COMMERCIAL

## 2024-07-24 ENCOUNTER — OFFICE VISIT (OUTPATIENT)
Dept: FAMILY MEDICINE CLINIC | Facility: CLINIC | Age: 59
End: 2024-07-24
Payer: COMMERCIAL

## 2024-07-24 VITALS
SYSTOLIC BLOOD PRESSURE: 130 MMHG | WEIGHT: 236 LBS | DIASTOLIC BLOOD PRESSURE: 82 MMHG | OXYGEN SATURATION: 96 % | BODY MASS INDEX: 31.97 KG/M2 | HEART RATE: 78 BPM | HEIGHT: 72 IN | TEMPERATURE: 97.5 F

## 2024-07-24 VITALS
HEIGHT: 72 IN | RESPIRATION RATE: 18 BRPM | OXYGEN SATURATION: 97 % | TEMPERATURE: 98 F | WEIGHT: 239 LBS | SYSTOLIC BLOOD PRESSURE: 164 MMHG | HEART RATE: 79 BPM | BODY MASS INDEX: 32.37 KG/M2 | DIASTOLIC BLOOD PRESSURE: 94 MMHG

## 2024-07-24 DIAGNOSIS — K92.1 HEMATOCHEZIA: Primary | ICD-10-CM

## 2024-07-24 DIAGNOSIS — K92.1 HEMATOCHEZIA: ICD-10-CM

## 2024-07-24 DIAGNOSIS — K62.5 RECTAL BLEED: Primary | ICD-10-CM

## 2024-07-24 LAB
APTT PPP: 30.7 SECONDS (ref 23–35)
BASOPHILS # BLD AUTO: 0.06 10*3/MM3 (ref 0–0.2)
BASOPHILS NFR BLD AUTO: 0.7 % (ref 0–1.5)
DEPRECATED RDW RBC AUTO: 41.1 FL (ref 37–54)
EOSINOPHIL # BLD AUTO: 0.12 10*3/MM3 (ref 0–0.4)
EOSINOPHIL NFR BLD AUTO: 1.4 % (ref 0.3–6.2)
ERYTHROCYTE [DISTWIDTH] IN BLOOD BY AUTOMATED COUNT: 14.3 % (ref 12.3–15.4)
ERYTHROCYTE [SEDIMENTATION RATE] IN BLOOD: 5 MM/HR (ref 0–20)
EXPIRATION DATE: NORMAL
HCT VFR BLD AUTO: 37.1 % (ref 37.5–51)
HGB BLD-MCNC: 12.2 G/DL (ref 13–17.7)
HGB BLDA-MCNC: 12.6 G/DL (ref 12–17)
IMM GRANULOCYTES # BLD AUTO: 0.02 10*3/MM3 (ref 0–0.05)
IMM GRANULOCYTES NFR BLD AUTO: 0.2 % (ref 0–0.5)
INR PPP: 0.97 (ref 0.9–1.1)
LYMPHOCYTES # BLD AUTO: 2.48 10*3/MM3 (ref 0.7–3.1)
LYMPHOCYTES NFR BLD AUTO: 30 % (ref 19.6–45.3)
Lab: NORMAL
MCH RBC QN AUTO: 26.3 PG (ref 26.6–33)
MCHC RBC AUTO-ENTMCNC: 32.9 G/DL (ref 31.5–35.7)
MCV RBC AUTO: 80 FL (ref 79–97)
MONOCYTES # BLD AUTO: 0.78 10*3/MM3 (ref 0.1–0.9)
MONOCYTES NFR BLD AUTO: 9.4 % (ref 5–12)
NEUTROPHILS NFR BLD AUTO: 4.82 10*3/MM3 (ref 1.7–7)
NEUTROPHILS NFR BLD AUTO: 58.3 % (ref 42.7–76)
NRBC BLD AUTO-RTO: 0 /100 WBC (ref 0–0.2)
PLATELET # BLD AUTO: 286 10*3/MM3 (ref 140–450)
PMV BLD AUTO: 9.7 FL (ref 6–12)
PROTHROMBIN TIME: 13.4 SECONDS (ref 12.3–15.1)
RBC # BLD AUTO: 4.64 10*6/MM3 (ref 4.14–5.8)
WBC NRBC COR # BLD AUTO: 8.28 10*3/MM3 (ref 3.4–10.8)

## 2024-07-24 PROCEDURE — 86140 C-REACTIVE PROTEIN: CPT

## 2024-07-24 PROCEDURE — 85018 HEMOGLOBIN: CPT

## 2024-07-24 PROCEDURE — 36415 COLL VENOUS BLD VENIPUNCTURE: CPT | Performed by: FAMILY MEDICINE

## 2024-07-24 PROCEDURE — 99214 OFFICE O/P EST MOD 30 MIN: CPT

## 2024-07-24 PROCEDURE — 85730 THROMBOPLASTIN TIME PARTIAL: CPT

## 2024-07-24 PROCEDURE — 85025 COMPLETE CBC W/AUTO DIFF WBC: CPT

## 2024-07-24 PROCEDURE — 83540 ASSAY OF IRON: CPT

## 2024-07-24 PROCEDURE — 82728 ASSAY OF FERRITIN: CPT

## 2024-07-24 PROCEDURE — 80053 COMPREHEN METABOLIC PANEL: CPT

## 2024-07-24 PROCEDURE — 85610 PROTHROMBIN TIME: CPT

## 2024-07-24 PROCEDURE — 84466 ASSAY OF TRANSFERRIN: CPT

## 2024-07-24 PROCEDURE — 85652 RBC SED RATE AUTOMATED: CPT

## 2024-07-24 PROCEDURE — 99244 OFF/OP CNSLTJ NEW/EST MOD 40: CPT | Performed by: SURGERY

## 2024-07-24 RX ORDER — MELOXICAM 15 MG/1
15 TABLET ORAL DAILY
COMMUNITY

## 2024-07-24 NOTE — PROGRESS NOTES
"    Office Note     Name: Kelton Escamilla    : 1965     MRN: [id number]     Chief Complaint  Blood in stool, upset stomach, diarrhea    History of Present Illness:  Kelton Escamilla is a 58 y.o. male who presents today for symptoms of diarrhea, upset stomach, and blood in his stool. He reports that 5-6 days ago, he went to a steakhouse with his wife and ate a medium well steak. He reports that a few hours afterwards, he was going to the bathroom frequently for diarrhea that was \"running through him.\" He reports that the next few days, he felt tired but had no further diarrhea. He reports that last night, he started having bright red blood when trying to have a bowel movement. He reports every time he uses the restroom, there is a significant amount of blood.  He reports it is bright red, denies it is maroon or dark-colored in appearance.  He reports that his upper abdomen is hurting. He reports that nothing makes the pain worse but laying on his belly provides relief. He denies any known history of hemorrhoids. He denies rectal pain. Denies constipation history or recent large stool. He denies fever or chills. He denies nausea or vomiting. His wife does not have any similar symptoms. He denies black appearance to stools, denies frequent NSAID use or alcohol use. His last colonoscopy was in 2020 and he was due for repeat in 5 years, per patient. Last colonoscopy did show diverticulosis and small polyp. He denies any history of abdominal surgeries.       Subjective     Review of Systems:   Review of Systems   Constitutional:  Positive for appetite change and fatigue. Negative for chills and fever.   Respiratory:  Negative for shortness of breath.    Cardiovascular:  Negative for chest pain.   Gastrointestinal:  Positive for abdominal pain, blood in stool and diarrhea. Negative for constipation, nausea, rectal pain and vomiting.   Neurological:  Negative for dizziness, syncope and light-headedness. "       I have reviewed the patients family history, social history, past medical history, past surgical history and have updated it as appropriate.     Past Medical History:   Past Medical History:   Diagnosis Date    Acute bronchitis     Allergic rhinitis, seasonal     Blurred vision, bilateral     BMI 34.0-34.9,adult     Cervical stenosis of spine     Cervicalgia     Chest pain, localized     Chronic thoracic back pain     Concussion     Cough     Diabetes     DISH (diffuse idiopathic skeletal hyperostosis)     Exercise counseling     Generalized headaches     Hematuria     Hyperlipidemia     Hypertension     Insomnia     Left leg injury     Left low back pain     Nutritional counseling     Prostatitis     Radiculopathy, cervical     Right knee pain     Sinusitis, acute     Tension headache     Thoracic back pain     Urinary frequency     Wears eyeglasses        Past Surgical History:   Past Surgical History:   Procedure Laterality Date    ANTERIOR CERVICAL DISCECTOMY W/ FUSION N/A 3/21/2017    Procedure: CERVICAL DISCECTOMY ANTERIOR WITH FUSION WITH INSTRUMENTATION;  Surgeon: Shadi Mckenna MD;  Location: [facility];  Service:     SHOULDER SURGERY Right     times 4       Family History:   Family History   Problem Relation Age of Onset    Stroke Other     Diabetes Other     Hypertension Other     Prostate cancer Other        Social History:   Social History     Socioeconomic History    Marital status:    Tobacco Use    Smoking status: Never     Passive exposure: Never    Smokeless tobacco: Current     Types: Chew   Vaping Use    Vaping status: Never Used   Substance and Sexual Activity    Alcohol use: Yes     Comment: OCC    Drug use: No    Sexual activity: Yes     Partners: Female     Comment: spouse       Immunizations:   Immunization History   Administered Date(s) Administered    COVID-19 (MODERNA) 1st,2nd,3rd Dose Monovalent 12/30/2020, 01/29/2021, 01/14/2022    Fluzone (or Fluarix & Flulaval for VFC)  ">6mos 10/27/2020, 11/06/2023    Influenza, Unspecified 10/27/2020    Pneumococcal Conjugate 20-Valent (PCV20) 09/26/2023    Tdap 09/26/2023        Medications:     Current Outpatient Medications:     amLODIPine (NORVASC) 10 MG tablet, Take 1 tablet by mouth Daily., Disp: 30 tablet, Rfl: 5    cetirizine (zyrTEC) 10 MG tablet, Take 1 tablet by mouth Daily., Disp: 30 tablet, Rfl: 2    cyclobenzaprine (FLEXERIL) 5 MG tablet, Take 2 tablets by mouth 3 (Three) Times a Day As Needed for Muscle Spasms., Disp: 30 tablet, Rfl: 2    losartan (COZAAR) 100 MG tablet, Take 1 tablet by mouth Daily., Disp: 30 tablet, Rfl: 5    Melatonin 10 MG tablet, Take 1 tablet by mouth Every Night. Try 1/2 tablet nightly 2 hours prior to bedtime. If 1/2 tablet not effective after 1 week, increase to 1 full tablet nightly., Disp: 30 tablet, Rfl: 5    metFORMIN ER (GLUCOPHAGE-XR) 500 MG 24 hr tablet, Take 2 tablets by mouth 2 (Two) Times a Day. (Patient taking differently: Take 2 tablets by mouth 2 (Two) Times a Day. Taking randomly.), Disp: 120 tablet, Rfl: 5    rosuvastatin (Crestor) 40 MG tablet, Take 1 tablet by mouth Daily. (Patient taking differently: Take 1 tablet by mouth Daily. Not taking regular.), Disp: 30 tablet, Rfl: 5    Allergies:   No Known Allergies    Objective     Vital Signs  Vitals:    07/24/24 0833 07/24/24 0905 07/24/24 0906 07/24/24 0907   BP: 130/80 160/90 160/94 164/94   BP Location: Right arm Left arm Left arm    Patient Position: Sitting Lying Sitting Standing   Cuff Size: Adult      Pulse: 75 76 85 79   Resp: 18      Temp: 98 °F (36.7 °C)      TempSrc: Temporal      SpO2: 96% 95% 97% 97%   Weight: 108 kg (239 lb)      Height: 182.9 cm (72.01\")        Estimated body mass index is 32.41 kg/m² as calculated from the following:    Height as of this encounter: 182.9 cm (72.01\").    Weight as of this encounter: 108 kg (239 lb).            Physical Exam  Vitals and nursing note reviewed. Exam conducted with a chaperone " present.   Constitutional:       General: He is not in acute distress.     Appearance: Normal appearance. He is not ill-appearing, toxic-appearing or diaphoretic.   HENT:      Head: Atraumatic.   Eyes:      Extraocular Movements: Extraocular movements intact.   Cardiovascular:      Rate and Rhythm: Normal rate and regular rhythm.      Heart sounds: No murmur heard.     No friction rub. No gallop.   Pulmonary:      Effort: Pulmonary effort is normal. No respiratory distress.      Breath sounds: No wheezing, rhonchi or rales.   Abdominal:      General: Abdomen is flat. There is no distension.      Palpations: Abdomen is soft.      Tenderness: There is abdominal tenderness (Tenderness to palpation of epigastric region). There is no guarding or rebound.      Comments: Hyperactive bowel sounds   Genitourinary:     Comments: Rosa Maria Cadena MA was chaperone  ANIL was performed, no anal fissure visualized, no hemorrhoids felt, but bright red blood was present on glove following examination  Musculoskeletal:      Cervical back: Normal range of motion.   Skin:     General: Skin is warm.   Neurological:      Mental Status: He is alert.      Gait: Gait normal.   Psychiatric:         Mood and Affect: Mood normal.         Thought Content: Thought content normal.        Assessment and Plan     1. Hematochezia  -POC Hemoglobin= 12.6.  -Orthostatic vitals obtained in the office do not reveal orthostasis and did not cause patient to become symptomatic.   -Labwork ordered for further evaluation.  -STAT referral to general surgery for colonoscopy/further evaluation placed. Same day appointment was secured for the patient and he was agreeable to referral.   -Differential diagnoses discussed with the patient: viral gastroenteritis, parasitic infection, hemorrhoidal bleeding, anal fissure, colon cancer, diverticular bleeding, GI bleed, and more.  -Patient has been advised that if bleeding becomes larger in amount, blood becomes maroon or  dark in appearance, abdominal pain worsens, he develops fever or chills, develops vomiting, develops lightheadedness, dizziness, or syncope, or any new symptom that he is to seek emergent care.  -I did consult my supervising physician, Dr. Lopez, who is the patient's PCP and he is in agreement with treatment plan.   -The patient verbalized understanding and had no further questions.  - Occult Blood X 1, Stool - Stool, Per Rectum; Future  - Clostridioides difficile Toxin - Stool, Per Rectum; Future  - Gastrointestinal Panel, PCR - Stool, Per Rectum; Future  - Ova & Parasite Examination - Stool, Per Rectum; Future  - POCT hemoglobin  - CBC w AUTO Differential; Future  - Comprehensive Metabolic Panel; Future  - Iron Profile; Future  - Ferritin; Future  - Protime-INR; Future  - aPTT; Future  - Ambulatory Referral to General Surgery  - C-reactive Protein; Future  - Sedimentation Rate; Future       Follow Up  No follow-ups on file.    Janneth Gunter PA-C  [facility] [place] [facility]

## 2024-07-24 NOTE — PROGRESS NOTES
Patient: Kelton Escamilla    YOB: 1965    Date: 07/24/2024    Primary Care Provider: Ángel Lopez MD    Chief Complaint   Patient presents with    Diarrhea       SUBJECTIVE:    History of present illness:  Patient has a history significant for diverticulosis. I saw the patient in the office today as a consultation for evaluation and treatment of bouts of diarrhea with visible bright red colored rectal bleeding.  Patient had colonoscopy performed 02/18/2020 at which time he had a transverse colon polyp removed.  Pathology report showed benign colonic mucosa.    Over the last day or so he has noticed increasing blood per rectum.    The following portions of the patient's history were reviewed and updated as appropriate: allergies, current medications, past family history, past medical history, past social history, past surgical history and problem list.      Review of Systems:  Constitutional:  Negative for chills, fever, and unexpected weight change.  HENT: Negative for trouble swallowing and voice change.  Eyes:  Negative for visual disturbance.  Respiratory:  Negative for apnea, cough, chest tightness, shortness of breath, and wheezing.  Cardiovascular:  Negative for chest pain, palpitations, and leg swelling.  Gastrointestinal:  Negative for abdominal distention, abdominal pain, constipation, diarrhea, nausea, rectal pain, and vomiting.  There is a history significant for rectal bleeding  Musculoskeletal:  Negative for back pain, gait problem, and joint swelling.  Skin:  Negative for color change, rash, and wound  Neurological:  Negative for dizziness, syncope, speech difficulty, weakness, numbness, and headaches.  Hematological:  Negative for adenopathy.  Does not bruise/bleed easily.  Psychiatric/Behavioral:  Negative for confusion.  The patient is not nervous/anxious.          History:  Past Medical History:   Diagnosis Date    Acute bronchitis     Allergic rhinitis, seasonal      Blurred vision, bilateral     BMI 34.0-34.9,adult     Cervical stenosis of spine     Cervicalgia     Chest pain, localized     Chronic thoracic back pain     Concussion     Cough     Diabetes     DISH (diffuse idiopathic skeletal hyperostosis)     Exercise counseling     Generalized headaches     Hematuria     Hyperlipidemia     Hypertension     Insomnia     Left leg injury     Left low back pain     Nutritional counseling     Prostatitis     Radiculopathy, cervical     Right knee pain     Sinusitis, acute     Tension headache     Thoracic back pain     Urinary frequency     Wears eyeglasses        Past Surgical History:   Procedure Laterality Date    ANTERIOR CERVICAL DISCECTOMY W/ FUSION N/A 3/21/2017    Procedure: CERVICAL DISCECTOMY ANTERIOR WITH FUSION WITH INSTRUMENTATION;  Surgeon: Shadi Mckenna MD;  Location: [facility];  Service:     SHOULDER SURGERY Right     times 4       Family History   Problem Relation Age of Onset    Stroke Other     Diabetes Other     Hypertension Other     Prostate cancer Other        Social History     Tobacco Use    Smoking status: Never     Passive exposure: Never    Smokeless tobacco: Current     Types: Chew   Vaping Use    Vaping status: Never Used   Substance Use Topics    Alcohol use: Yes     Comment: OCC    Drug use: No       Allergies:  No Known Allergies    Medications:    Current Outpatient Medications:     amLODIPine (NORVASC) 10 MG tablet, Take 1 tablet by mouth Daily., Disp: 30 tablet, Rfl: 5    cetirizine (zyrTEC) 10 MG tablet, Take 1 tablet by mouth Daily., Disp: 30 tablet, Rfl: 2    cyclobenzaprine (FLEXERIL) 5 MG tablet, Take 2 tablets by mouth 3 (Three) Times a Day As Needed for Muscle Spasms., Disp: 30 tablet, Rfl: 2    losartan (COZAAR) 100 MG tablet, Take 1 tablet by mouth Daily., Disp: 30 tablet, Rfl: 5    Melatonin 10 MG tablet, Take 1 tablet by mouth Every Night. Try 1/2 tablet nightly 2 hours prior to bedtime. If 1/2 tablet not effective after 1 week,  "increase to 1 full tablet nightly., Disp: 30 tablet, Rfl: 5    meloxicam (MOBIC) 15 MG tablet, Take 1 tablet by mouth Daily., Disp: , Rfl:     metFORMIN ER (GLUCOPHAGE-XR) 500 MG 24 hr tablet, Take 2 tablets by mouth 2 (Two) Times a Day. (Patient taking differently: Take 2 tablets by mouth 2 (Two) Times a Day. Taking randomly.), Disp: 120 tablet, Rfl: 5    rosuvastatin (Crestor) 40 MG tablet, Take 1 tablet by mouth Daily. (Patient taking differently: Take 1 tablet by mouth Daily. Not taking regular.), Disp: 30 tablet, Rfl: 5    OBJECTIVE:    Vital Signs:   Vitals:    07/24/24 1414   BP: 130/82   Pulse: 78   Temp: 97.5 °F (36.4 °C)   SpO2: 96%   Weight: 107 kg (236 lb)   Height: 182.9 cm (72.01\")         Physical Exam:     General Appearance:    Alert, cooperative, in no acute distress   Head:    Normocephalic, without obvious abnormality, atraumatic   Eyes:            Lids and lashes normal, conjunctivae and sclerae normal, no   icterus, no pallor, corneas clear, PERRLA   Ears:    Ears appear intact with no abnormalities noted   Throat:   No oral lesions, no thrush, oral mucosa moist   Neck:   No adenopathy, supple, trachea midline, no thyromegaly, no   carotid bruit, no JVD   Back:     No kyphosis present, no scoliosis present, no skin lesions,      erythema or scars, no tenderness to percussion or                   palpation,   range of motion normal   Lungs:     Clear to auscultation,respirations regular, even and                  unlabored    Heart:    Regular rhythm and normal rate, normal S1 and S2, no            murmur, no gallop, no rub, no click   Chest Wall:    No abnormalities observed   Abdomen:     Normal bowel sounds, no masses, no organomegaly, soft        non-tender, non-distended, no guarding,    Extremities:   Moves all extremities well, no edema, no cyanosis, no             redness   Pulses:   Pulses palpable and equal bilaterally   Skin:   No bleeding, bruising or rash   Lymph nodes:   No " palpable adenopathy   Neurologic:   Cranial nerves 2 - 12 grossly intact, sensation intact, DTR       present and equal bilaterally        Results Review:   I reviewed the patient's new clinical results.  I reviewed the patient's new imaging results and agree with the interpretation.  I reviewed the patient's other test results and agree with the interpretation    Review of Systems was reviewed and confirmed as accurate as documented by the MA.    ASSESSMENT/PLAN:    1. Rectal bleed        I did have a detailed and extensive discussion with the patient in the office today.  The full risks and benefits of operative versus nonoperative intervention were discussed with the paient, they understand, agree, and wish to proceed with the surgical treatment plan of colonoscopy.    Electronically signed by Ishan Boggs MD  07/24/24 10:38 EDT

## 2024-07-25 ENCOUNTER — LAB (OUTPATIENT)
Dept: FAMILY MEDICINE CLINIC | Facility: CLINIC | Age: 59
End: 2024-07-25
Payer: COMMERCIAL

## 2024-07-25 ENCOUNTER — TELEPHONE (OUTPATIENT)
Dept: FAMILY MEDICINE CLINIC | Facility: CLINIC | Age: 59
End: 2024-07-25
Payer: COMMERCIAL

## 2024-07-25 DIAGNOSIS — R79.89 ELEVATED FERRITIN: Primary | ICD-10-CM

## 2024-07-25 DIAGNOSIS — K92.1 HEMATOCHEZIA: ICD-10-CM

## 2024-07-25 LAB
ADV 40+41 DNA STL QL NAA+NON-PROBE: NOT DETECTED
ALBUMIN SERPL-MCNC: 4.6 G/DL (ref 3.5–5.2)
ALBUMIN/GLOB SERPL: 1.6 G/DL
ALP SERPL-CCNC: 64 U/L (ref 39–117)
ALT SERPL W P-5'-P-CCNC: 25 U/L (ref 1–41)
ANION GAP SERPL CALCULATED.3IONS-SCNC: 11 MMOL/L (ref 5–15)
AST SERPL-CCNC: 19 U/L (ref 1–40)
ASTRO TYP 1-8 RNA STL QL NAA+NON-PROBE: NOT DETECTED
BILIRUB SERPL-MCNC: 0.9 MG/DL (ref 0–1.2)
BUN SERPL-MCNC: 23 MG/DL (ref 6–20)
BUN/CREAT SERPL: 20.4 (ref 7–25)
C CAYETANENSIS DNA STL QL NAA+NON-PROBE: NOT DETECTED
C COLI+JEJ+UPSA DNA STL QL NAA+NON-PROBE: NOT DETECTED
C DIFF TOX GENS STL QL NAA+PROBE: NOT DETECTED
CALCIUM SPEC-SCNC: 9.7 MG/DL (ref 8.6–10.5)
CHLORIDE SERPL-SCNC: 105 MMOL/L (ref 98–107)
CO2 SERPL-SCNC: 23 MMOL/L (ref 22–29)
CREAT SERPL-MCNC: 1.13 MG/DL (ref 0.76–1.27)
CRP SERPL-MCNC: <0.3 MG/DL (ref 0–0.5)
CRYPTOSP DNA STL QL NAA+NON-PROBE: NOT DETECTED
E HISTOLYT DNA STL QL NAA+NON-PROBE: NOT DETECTED
EAEC PAA PLAS AGGR+AATA ST NAA+NON-PRB: NOT DETECTED
EC STX1+STX2 GENES STL QL NAA+NON-PROBE: NOT DETECTED
EGFRCR SERPLBLD CKD-EPI 2021: 75.8 ML/MIN/1.73
EPEC EAE GENE STL QL NAA+NON-PROBE: NOT DETECTED
ETEC LTA+ST1A+ST1B TOX ST NAA+NON-PROBE: NOT DETECTED
FERRITIN SERPL-MCNC: 1257 NG/ML (ref 30–400)
G LAMBLIA DNA STL QL NAA+NON-PROBE: NOT DETECTED
GLOBULIN UR ELPH-MCNC: 2.9 GM/DL
GLUCOSE SERPL-MCNC: 130 MG/DL (ref 65–99)
HEMOCCULT STL QL: POSITIVE
IRON 24H UR-MRATE: 82 MCG/DL (ref 59–158)
IRON SATN MFR SERPL: 25 % (ref 20–50)
NOROVIRUS GI+II RNA STL QL NAA+NON-PROBE: NOT DETECTED
P SHIGELLOIDES DNA STL QL NAA+NON-PROBE: NOT DETECTED
POTASSIUM SERPL-SCNC: 4.4 MMOL/L (ref 3.5–5.2)
PROT SERPL-MCNC: 7.5 G/DL (ref 6–8.5)
RVA RNA STL QL NAA+NON-PROBE: NOT DETECTED
S ENT+BONG DNA STL QL NAA+NON-PROBE: NOT DETECTED
SAPO I+II+IV+V RNA STL QL NAA+NON-PROBE: NOT DETECTED
SHIGELLA SP+EIEC IPAH ST NAA+NON-PROBE: NOT DETECTED
SODIUM SERPL-SCNC: 139 MMOL/L (ref 136–145)
TIBC SERPL-MCNC: 331 MCG/DL (ref 298–536)
TRANSFERRIN SERPL-MCNC: 222 MG/DL (ref 200–360)
V CHOL+PARA+VUL DNA STL QL NAA+NON-PROBE: NOT DETECTED
V CHOLERAE DNA STL QL NAA+NON-PROBE: NOT DETECTED
Y ENTEROCOL DNA STL QL NAA+NON-PROBE: NOT DETECTED

## 2024-07-25 PROCEDURE — 82272 OCCULT BLD FECES 1-3 TESTS: CPT

## 2024-07-25 PROCEDURE — 87507 IADNA-DNA/RNA PROBE TQ 12-25: CPT

## 2024-07-25 PROCEDURE — 87209 SMEAR COMPLEX STAIN: CPT

## 2024-07-25 PROCEDURE — 87493 C DIFF AMPLIFIED PROBE: CPT

## 2024-07-25 PROCEDURE — 87177 OVA AND PARASITES SMEARS: CPT

## 2024-07-25 NOTE — TELEPHONE ENCOUNTER
----- Message from Hanh Gunter sent at 7/24/2024  4:40 PM EDT -----  Blood clotting times are appropriate.

## 2024-07-25 NOTE — PROGRESS NOTES
Attempted to call patient with results. Left a detailed voicemail with patients results. Instructed patient to call us if they have any questions or concerns. Telephone encounter created

## 2024-07-25 NOTE — TELEPHONE ENCOUNTER
Patient was informed of results and verbalized good understanding and appreciation.He said he is feeling some better. The bleeding is better. He is still sore in his abdomen area. He is also feeling dizzy today.

## 2024-07-25 NOTE — TELEPHONE ENCOUNTER
Attempted to call patient with results. Left a detailed voicemail with patients results. Instructed patient to call us if they have any questions or concerns. Relay

## 2024-08-01 ENCOUNTER — TELEPHONE (OUTPATIENT)
Dept: FAMILY MEDICINE CLINIC | Facility: CLINIC | Age: 59
End: 2024-08-01
Payer: COMMERCIAL

## 2024-08-01 ENCOUNTER — TELEPHONE (OUTPATIENT)
Dept: SURGERY | Facility: CLINIC | Age: 59
End: 2024-08-01
Payer: COMMERCIAL

## 2024-08-01 RX ORDER — POLYETHYLENE GLYCOL 3350 17 G/17G
17 POWDER, FOR SOLUTION ORAL DAILY
Qty: 238 G | Refills: 0 | Status: SHIPPED | OUTPATIENT
Start: 2024-08-01

## 2024-08-01 RX ORDER — BISACODYL 5 MG/1
TABLET, DELAYED RELEASE ORAL
Qty: 4 TABLET | Refills: 0 | Status: SHIPPED | OUTPATIENT
Start: 2024-08-01

## 2024-08-01 NOTE — TELEPHONE ENCOUNTER
"I called [facility] to confirm receipt of Miralax and Dulcolax Rx to be used for bowel prep. Per Mallory, pt was informed to check with pharmacy \"later\" and he knows to call back to the office if needed.  "

## 2024-08-01 NOTE — TELEPHONE ENCOUNTER
Relay       PATIENT STOPPED IN TO LET US KNOW THAT DR ANDREWS OFFICE HAD NOT SENT IN PREP FOR COLONOSCOPY / ADVISED HE WOULD NEED TO CONTACT HIS OFFICE / IF FOR SOME REASON THEY WOULD NOT SEND, LET US KNOW AND WE WOULD SEND FOR HIM

## 2024-08-02 ENCOUNTER — OUTSIDE FACILITY SERVICE (OUTPATIENT)
Dept: SURGERY | Facility: CLINIC | Age: 59
End: 2024-08-02
Payer: COMMERCIAL

## 2024-08-02 LAB
O+P SPEC MICRO: NORMAL
O+P STL CONC: NORMAL

## 2024-08-09 NOTE — PROGRESS NOTES
Patient: Kelton Escamilla    YOB: 1965    Date: 08/12/2024    Primary Care Provider: Ángel Lopez MD    Chief Complaint   Patient presents with    Follow-up     COLON    Abdominal Pain       SUBJECTIVE:    History of present illness:  I saw the patient in the office today as a follow-up colonoscopy with biopsies which was performed 08/02/2024.  Cecum biopsy pathology report showed colonic mucosa with no significant histopathologic abnormalities.  Patient is also here for consultation for evaluation and treatment of epigastric area pain.    He has had epigastric abdominal discomfort and occasional reflux.    The following portions of the patient's history were reviewed and updated as appropriate: allergies, current medications, past family history, past medical history, past social history, past surgical history and problem list.    Review of Systems   Constitutional:  Negative for chills, fever and unexpected weight change.   HENT:  Negative for trouble swallowing and voice change.    Eyes:  Negative for visual disturbance.   Respiratory:  Negative for apnea, cough, chest tightness, shortness of breath and wheezing.    Cardiovascular:  Negative for chest pain, palpitations and leg swelling.   Gastrointestinal:  Positive for abdominal pain. Negative for abdominal distention, anal bleeding, blood in stool, constipation, diarrhea, nausea, rectal pain and vomiting.   Endocrine: Negative for cold intolerance and heat intolerance.   Genitourinary:  Negative for difficulty urinating, dysuria, flank pain, scrotal swelling and testicular pain.   Musculoskeletal:  Negative for back pain, gait problem and joint swelling.   Skin:  Negative for color change, rash and wound.   Neurological:  Negative for dizziness, syncope, speech difficulty, weakness, numbness and headaches.   Hematological:  Negative for adenopathy. Does not bruise/bleed easily.   Psychiatric/Behavioral:  Negative for confusion. The  patient is not nervous/anxious.          Review of Systems:  Constitutional:  Negative for chills, fever, and unexpected weight change.  HENT: Negative for trouble swallowing and voice change.  Eyes:  Negative for visual disturbance.  Respiratory:  Negative for apnea, cough, chest tightness, shortness of breath, and wheezing.  Cardiovascular:  Negative for chest pain, palpitations, and leg swelling.  Gastrointestinal:  Negative for abdominal distention, abdominal pain, anal bleeding, blood in stool, constipation, diarrhea, nausea, rectal pain, and vomiting.  Musculoskeletal:  Negative for back pain, gait problem, and joint swelling.  Skin:  Negative for color change, rash, and wound  Neurological:  Negative for dizziness, syncope, speech difficulty, weakness, numbness, and headaches.  Hematological:  Negative for adenopathy.  Does not bruise/bleed easily.  Psychiatric/Behavioral:  Negative for confusion.  The patient is not nervous/anxious.          History:  Past Medical History:   Diagnosis Date    Acute bronchitis     Allergic rhinitis, seasonal     Blurred vision, bilateral     BMI 34.0-34.9,adult     Cervical stenosis of spine     Cervicalgia     Chest pain, localized     Chronic thoracic back pain     Concussion     Cough     Diabetes     DISH (diffuse idiopathic skeletal hyperostosis)     Exercise counseling     Generalized headaches     Hematuria     Hyperlipidemia     Hypertension     Insomnia     Left leg injury     Left low back pain     Nutritional counseling     Prostatitis     Radiculopathy, cervical     Right knee pain     Sinusitis, acute     Tension headache     Thoracic back pain     Urinary frequency     Wears eyeglasses        Past Surgical History:   Procedure Laterality Date    ANTERIOR CERVICAL DISCECTOMY W/ FUSION N/A 3/21/2017    Procedure: CERVICAL DISCECTOMY ANTERIOR WITH FUSION WITH INSTRUMENTATION;  Surgeon: Shadi Mckenna MD;  [facility];  Service:     SHOULDER SURGERY Right      times 4       Family History   Problem Relation Age of Onset    Stroke Other     Diabetes Other     Hypertension Other     Prostate cancer Other        Social History     Tobacco Use    Smoking status: Never     Passive exposure: Never    Smokeless tobacco: Current     Types: Chew   Vaping Use    Vaping status: Never Used   Substance Use Topics    Alcohol use: Yes     Comment: OCC    Drug use: No       Allergies:  No Known Allergies    Medications:    Current Outpatient Medications:     amLODIPine (NORVASC) 10 MG tablet, Take 1 tablet by mouth Daily., Disp: 30 tablet, Rfl: 5    cetirizine (zyrTEC) 10 MG tablet, Take 1 tablet by mouth Daily., Disp: 30 tablet, Rfl: 2    cyclobenzaprine (FLEXERIL) 5 MG tablet, Take 2 tablets by mouth 3 (Three) Times a Day As Needed for Muscle Spasms., Disp: 30 tablet, Rfl: 2    losartan (COZAAR) 100 MG tablet, Take 1 tablet by mouth Daily., Disp: 30 tablet, Rfl: 5    Melatonin 10 MG tablet, Take 1 tablet by mouth Every Night. Try 1/2 tablet nightly 2 hours prior to bedtime. If 1/2 tablet not effective after 1 week, increase to 1 full tablet nightly., Disp: 30 tablet, Rfl: 5    meloxicam (MOBIC) 15 MG tablet, Take 1 tablet by mouth Daily., Disp: , Rfl:     metFORMIN ER (GLUCOPHAGE-XR) 500 MG 24 hr tablet, Take 2 tablets by mouth 2 (Two) Times a Day. (Patient taking differently: Take 2 tablets by mouth 2 (Two) Times a Day. Taking randomly.), Disp: 120 tablet, Rfl: 5    rosuvastatin (Crestor) 40 MG tablet, Take 1 tablet by mouth Daily. (Patient taking differently: Take 1 tablet by mouth Daily. Not taking regular.), Disp: 30 tablet, Rfl: 5    bisacodyl (Dulcolax) 5 MG EC tablet, Take 2 @ 3pm, 2 @ 7 pm day prior to colonoscopy (Patient not taking: Reported on 8/12/2024), Disp: 4 tablet, Rfl: 0    polyethylene glycol (MIRALAX) 17 GM/SCOOP powder, Take 17 g by mouth Daily. USE AS DIRECTED FOR BOWEL PREP, WRITTEN INSTRUCTIONS GIVEN (Patient not taking: Reported on 8/12/2024), Disp: 238 g,  "Rfl: 0    OBJECTIVE:    Vital Signs:   Vitals:    08/12/24 1032   BP: 124/82   Pulse: 73   Temp: 98 °F (36.7 °C)   SpO2: 99%   Weight: 107 kg (236 lb 3.2 oz)   Height: 182.9 cm (72.01\")       Physical Exam:     General Appearance:    Alert, cooperative, in no acute distress   Head:    Normocephalic, without obvious abnormality, atraumatic   Eyes:            Lids and lashes normal, conjunctivae and sclerae normal, no   icterus, no pallor, corneas clear, PERRLA   Ears:    Ears appear intact with no abnormalities noted   Throat:   No oral lesions, no thrush, oral mucosa moist   Neck:   No adenopathy, supple, trachea midline, no thyromegaly, no   carotid bruit, no JVD   Back:     No kyphosis present, no scoliosis present, no skin lesions,      erythema or scars, no tenderness to percussion or                   palpation,   range of motion normal   Lungs:     Clear to auscultation,respirations regular, even and                  unlabored    Heart:    Regular rhythm and normal rate, normal S1 and S2, no            murmur, no gallop, no rub, no click   Chest Wall:    No abnormalities observed   Abdomen:     Normal bowel sounds, no masses, no organomegaly, soft        non-tender, non-distended, no guarding,    Extremities:   Moves all extremities well, no edema, no cyanosis, no             redness   Pulses:   Pulses palpable and equal bilaterally   Skin:   No bleeding, bruising or rash   Lymph nodes:   No palpable adenopathy   Neurologic:   Cranial nerves 2 - 12 grossly intact, sensation intact, DTR       present and equal bilaterally       Results Review:   I reviewed the patient's new clinical results.  I reviewed the patient's new imaging results and agree with the interpretation.  I reviewed the patient's other test results and agree with the interpretation    Review of Systems was reviewed and confirmed as accurate as documented by the MA.    ASSESSMENT/PLAN:    1. Epigastric pain    2. Heme positive stool        I did " have a detailed and extensive discussion with the patient in the office today.  The full risks and benefits of operative versus nonoperative intervention were discussed with the paient, they understand, agree, and wish to proceed with the surgical treatment plan of EGD.    He did have a negative gallbladder ultrasound today and we will order a HIDA scan.    Electronically signed by Ishan Boggs MD  08/15/24 14:17 EDT

## 2024-08-12 ENCOUNTER — OFFICE VISIT (OUTPATIENT)
Dept: SURGERY | Facility: CLINIC | Age: 59
End: 2024-08-12
Payer: COMMERCIAL

## 2024-08-12 VITALS
DIASTOLIC BLOOD PRESSURE: 82 MMHG | TEMPERATURE: 98 F | HEIGHT: 72 IN | HEART RATE: 73 BPM | OXYGEN SATURATION: 99 % | WEIGHT: 236.2 LBS | BODY MASS INDEX: 31.99 KG/M2 | SYSTOLIC BLOOD PRESSURE: 124 MMHG

## 2024-08-12 DIAGNOSIS — R19.5 HEME POSITIVE STOOL: ICD-10-CM

## 2024-08-12 DIAGNOSIS — R10.13 EPIGASTRIC PAIN: Primary | ICD-10-CM

## 2024-08-12 PROCEDURE — 99213 OFFICE O/P EST LOW 20 MIN: CPT | Performed by: SURGERY

## 2024-08-20 ENCOUNTER — OUTSIDE FACILITY SERVICE (OUTPATIENT)
Dept: SURGERY | Facility: CLINIC | Age: 59
End: 2024-08-20
Payer: COMMERCIAL

## 2024-08-30 ENCOUNTER — TELEPHONE (OUTPATIENT)
Dept: FAMILY MEDICINE CLINIC | Facility: CLINIC | Age: 59
End: 2024-08-30
Payer: COMMERCIAL

## 2024-08-30 NOTE — TELEPHONE ENCOUNTER
PATIENT WALKED IN ASKING FOR DATE OF LAST TDAP, ADVISED 9/23, SPOKE W/NURSE WHO ADVISED IT SHOULD BE GOOD, PATIENT HAD STUCK WIRE THRU HIS FINGER, HAD A BANDAID ON IT, OFFERED FOR HIM TO BE SEEN, PATIENT REFUSED, WAS ADVISED TO KEEP CLEAN, ANTIBIOTIC CREAM, FOLLOW UP IF NEEDED

## 2024-09-06 ENCOUNTER — HOSPITAL ENCOUNTER (OUTPATIENT)
Dept: NUCLEAR MEDICINE | Facility: HOSPITAL | Age: 59
Discharge: HOME | End: 2024-09-06
Payer: COMMERCIAL

## 2024-09-06 DIAGNOSIS — R19.5 HEME POSITIVE STOOL: ICD-10-CM

## 2024-09-06 DIAGNOSIS — R10.13 EPIGASTRIC PAIN: ICD-10-CM

## 2024-09-06 PROCEDURE — 0 TECHNETIUM TC 99M MEBROFENIN KIT: Performed by: SURGERY

## 2024-09-06 PROCEDURE — 78227 HEPATOBIL SYST IMAGE W/DRUG: CPT

## 2024-09-06 PROCEDURE — 25010000002 SINCALIDE PER 5 MCG: Performed by: SURGERY

## 2024-09-06 PROCEDURE — A9537 TC99M MEBROFENIN: HCPCS | Performed by: SURGERY

## 2024-09-06 RX ORDER — SINCALIDE 5 UG/5ML
0.02 INJECTION, POWDER, LYOPHILIZED, FOR SOLUTION INTRAVENOUS
Status: COMPLETED | OUTPATIENT
Start: 2024-09-06 | End: 2024-09-06

## 2024-09-06 RX ORDER — KIT FOR THE PREPARATION OF TECHNETIUM TC 99M MEBROFENIN 45 MG/10ML
1 INJECTION, POWDER, LYOPHILIZED, FOR SOLUTION INTRAVENOUS
Status: COMPLETED | OUTPATIENT
Start: 2024-09-06 | End: 2024-09-06

## 2024-09-06 RX ADMIN — SINCALIDE 2.1 MCG: 5 INJECTION, POWDER, LYOPHILIZED, FOR SOLUTION INTRAVENOUS at 07:15

## 2024-09-06 RX ADMIN — MEBROFENIN 1 DOSE: 45 INJECTION, POWDER, LYOPHILIZED, FOR SOLUTION INTRAVENOUS at 06:15

## 2024-09-10 NOTE — H&P (VIEW-ONLY)
Patient: Kelton Escamilla    YOB: 1965    Date: 09/11/2024    Primary Care Provider: Ángel Lopez MD    Chief Complaint   Patient presents with    Follow-up     EGD/HIDA scan       SUBJECTIVE:    History of present illness:  I saw the patient in the office today as a follow-up EGD which was performed 08/20/2024.  Gastric antrum biopsies showed minimal chronic inflammation and mild reactive epithelial changes.  Patient is also here for follow-up hida scan which was performed 09/06/2024, it showed an ejection fraction of 32%.    He continues to have right upper quadrant and midepigastric abdominal discomfort especially after meals.  He did have a recent visit to the urgent care center for abdominal pain.    The following portions of the patient's history were reviewed and updated as appropriate: allergies, current medications, past family history, past medical history, past social history, past surgical history and problem list.        Review of Systems:  Constitutional:  Negative for chills, fever, and unexpected weight change.  HENT: Negative for trouble swallowing and voice change.  Eyes:  Negative for visual disturbance.  Respiratory:  Negative for apnea, cough, chest tightness, shortness of breath, and wheezing.  Cardiovascular:  Negative for chest pain, palpitations, and leg swelling.  Gastrointestinal:  Negative for abdominal distention, anal bleeding, blood in stool, constipation, diarrhea, rectal pain, and vomiting.  There is a history of right upper quadrant pain and nausea  Musculoskeletal:  Negative for back pain, gait problem, and joint swelling.  Skin:  Negative for color change, rash, and wound  Neurological:  Negative for dizziness, syncope, speech difficulty, weakness, numbness, and headaches.  Hematological:  Negative for adenopathy.  Does not bruise/bleed easily.  Psychiatric/Behavioral:  Negative for confusion.  The patient is not  nervous/anxious.          History:  Past Medical History:   Diagnosis Date    Acute bronchitis     Allergic rhinitis, seasonal     Blurred vision, bilateral     BMI 34.0-34.9,adult     Cervical stenosis of spine     Cervicalgia     Chest pain, localized     Chronic thoracic back pain     Concussion     Cough     Diabetes     DISH (diffuse idiopathic skeletal hyperostosis)     Exercise counseling     Generalized headaches     Hematuria     Hyperlipidemia     Hypertension     Insomnia     Left leg injury     Left low back pain     Nutritional counseling     Prostatitis     Radiculopathy, cervical     Right knee pain     Sinusitis, acute     Tension headache     Thoracic back pain     Urinary frequency     Wears eyeglasses        Past Surgical History:   Procedure Laterality Date    ANTERIOR CERVICAL DISCECTOMY W/ FUSION N/A 3/21/2017    Procedure: CERVICAL DISCECTOMY ANTERIOR WITH FUSION WITH INSTRUMENTATION;  Surgeon: Shadi Mckenna MD;  Location: [facility];  Service:     SHOULDER SURGERY Right     times 4       Family History   Problem Relation Age of Onset    Stroke Other     Diabetes Other     Hypertension Other     Prostate cancer Other        Social History     Tobacco Use    Smoking status: Never     Passive exposure: Never    Smokeless tobacco: Current     Types: Chew   Vaping Use    Vaping status: Never Used   Substance Use Topics    Alcohol use: Yes     Comment: OCC    Drug use: No       Allergies:  No Known Allergies    Medications:    Current Outpatient Medications:     amLODIPine (NORVASC) 10 MG tablet, Take 1 tablet by mouth Daily., Disp: 30 tablet, Rfl: 5    cetirizine (zyrTEC) 10 MG tablet, Take 1 tablet by mouth Daily., Disp: 30 tablet, Rfl: 2    cyclobenzaprine (FLEXERIL) 5 MG tablet, Take 2 tablets by mouth 3 (Three) Times a Day As Needed for Muscle Spasms., Disp: 30 tablet, Rfl: 2    losartan (COZAAR) 100 MG tablet, Take 1 tablet by mouth Daily., Disp: 30 tablet, Rfl: 5    Melatonin 10 MG tablet,  "Take 1 tablet by mouth Every Night. Try 1/2 tablet nightly 2 hours prior to bedtime. If 1/2 tablet not effective after 1 week, increase to 1 full tablet nightly., Disp: 30 tablet, Rfl: 5    metFORMIN ER (GLUCOPHAGE-XR) 500 MG 24 hr tablet, Take 2 tablets by mouth 2 (Two) Times a Day. (Patient taking differently: Take 2 tablets by mouth 2 (Two) Times a Day. Taking randomly.), Disp: 120 tablet, Rfl: 5    rosuvastatin (Crestor) 40 MG tablet, Take 1 tablet by mouth Daily. (Patient taking differently: Take 1 tablet by mouth Daily. Not taking regular.), Disp: 30 tablet, Rfl: 5    bisacodyl (Dulcolax) 5 MG EC tablet, Take 2 @ 3pm, 2 @ 7 pm day prior to colonoscopy (Patient not taking: Reported on 8/12/2024), Disp: 4 tablet, Rfl: 0    ciprofloxacin (CIPRO) 500 MG tablet, Take 1 tablet by mouth Every 12 (Twelve) Hours. (Patient not taking: Reported on 9/11/2024), Disp: , Rfl:     Lactobacillus (Acidophilus) capsule, , Disp: , Rfl:     meloxicam (MOBIC) 15 MG tablet, Take 1 tablet by mouth Daily., Disp: , Rfl:     metroNIDAZOLE (FLAGYL) 500 MG tablet, Take 1 tablet by mouth 3 times a day. (Patient not taking: Reported on 9/11/2024), Disp: , Rfl:     polyethylene glycol (MIRALAX) 17 GM/SCOOP powder, Take 17 g by mouth Daily. USE AS DIRECTED FOR BOWEL PREP, WRITTEN INSTRUCTIONS GIVEN (Patient not taking: Reported on 8/12/2024), Disp: 238 g, Rfl: 0    OBJECTIVE:    Vital Signs:   Vitals:    09/11/24 1416   BP: 130/70   Pulse: 80   Temp: 98.9 °F (37.2 °C)   SpO2: 100%   Weight: 107 kg (236 lb)   Height: 182.9 cm (72.01\")       Physical Exam:     General Appearance:    Alert, cooperative, in no acute distress   Head:    Normocephalic, without obvious abnormality, atraumatic   Eyes:            Lids and lashes normal, conjunctivae and sclerae normal, no   icterus, no pallor, corneas clear, PERRLA   Ears:    Ears appear intact with no abnormalities noted   Throat:   No oral lesions, no thrush, oral mucosa moist   Neck:   No " adenopathy, supple, trachea midline, no thyromegaly, no   carotid bruit, no JVD   Back:     No kyphosis present, no scoliosis present, no skin lesions,      erythema or scars, no tenderness to percussion or                   palpation,   range of motion normal   Lungs:     Clear to auscultation,respirations regular, even and                  unlabored    Heart:    Regular rhythm and normal rate, normal S1 and S2, no            murmur, no gallop, no rub, no click   Chest Wall:    No abnormalities observed   Abdomen:     Normal bowel sounds, no masses, no organomegaly, soft        tender in the right upper quadrant, non-distended, no guarding, there is evidence of right upper quadrant tenderness   Extremities:   Moves all extremities well, no edema, no cyanosis, no             redness   Pulses:   Pulses palpable and equal bilaterally   Skin:   No bleeding, bruising or rash   Lymph nodes:   No palpable adenopathy   Neurologic:   Cranial nerves 2 - 12 grossly intact, sensation intact, DTR       present and equal bilaterally                 Results Review:   I reviewed the patient's new clinical results.  I reviewed the patient's new imaging results and agree with the interpretation.  I reviewed the patient's other test results and agree with the interpretation    Review of Systems was reviewed and confirmed as accurate as documented by the MA.    ASSESSMENT/PLAN:    1. Biliary dyskinesia          I did have a detailed and extensive discussion with the patient in the office today and they understand that they need to undergo robotic assisted laparoscopic cholecystectomy with intraoperative cholangiography and/or immunofluorescence or possible open cholecystectomy. Full risks and benefits of operative versus nonoperative intervention were discussed with the patient and these included things such as nonresolution of symptoms and possible worsening of symptoms without surgical intervention versus infection, bleeding, open  cholecystectomy, common bile duct injury, postoperative biliary leakage, need for drain placement, possible inability to perform cholangiography due to inflammation, postoperative abscess, etc with surgical intervention. The patient understands, agrees, and wishes to proceed with the surgical treatment plan as mentioned above. The patient had no questions for me at the end of the discussion.  I did draw a picture of the anatomy for the patient and used this in my informed consent.     I discussed the patients findings and my recommendations with patient and the wife in the office today.    Electronically signed by Ishan Boggs MD  09/11/24

## 2024-09-10 NOTE — PROGRESS NOTES
Patient: Kelton Escamilla    YOB: 1965    Date: 09/11/2024    Primary Care Provider: Ángel Lopez MD    Chief Complaint   Patient presents with    Follow-up     EGD/HIDA scan       SUBJECTIVE:    History of present illness:  I saw the patient in the office today as a follow-up EGD which was performed 08/20/2024.  Gastric antrum biopsies showed minimal chronic inflammation and mild reactive epithelial changes.  Patient is also here for follow-up hida scan which was performed 09/06/2024, it showed an ejection fraction of 32%.    He continues to have right upper quadrant and midepigastric abdominal discomfort especially after meals.  He did have a recent visit to the urgent care center for abdominal pain.    The following portions of the patient's history were reviewed and updated as appropriate: allergies, current medications, past family history, past medical history, past social history, past surgical history and problem list.        Review of Systems:  Constitutional:  Negative for chills, fever, and unexpected weight change.  HENT: Negative for trouble swallowing and voice change.  Eyes:  Negative for visual disturbance.  Respiratory:  Negative for apnea, cough, chest tightness, shortness of breath, and wheezing.  Cardiovascular:  Negative for chest pain, palpitations, and leg swelling.  Gastrointestinal:  Negative for abdominal distention, anal bleeding, blood in stool, constipation, diarrhea, rectal pain, and vomiting.  There is a history of right upper quadrant pain and nausea  Musculoskeletal:  Negative for back pain, gait problem, and joint swelling.  Skin:  Negative for color change, rash, and wound  Neurological:  Negative for dizziness, syncope, speech difficulty, weakness, numbness, and headaches.  Hematological:  Negative for adenopathy.  Does not bruise/bleed easily.  Psychiatric/Behavioral:  Negative for confusion.  The patient is not  nervous/anxious.          History:  Past Medical History:   Diagnosis Date    Acute bronchitis     Allergic rhinitis, seasonal     Blurred vision, bilateral     BMI 34.0-34.9,adult     Cervical stenosis of spine     Cervicalgia     Chest pain, localized     Chronic thoracic back pain     Concussion     Cough     Diabetes     DISH (diffuse idiopathic skeletal hyperostosis)     Exercise counseling     Generalized headaches     Hematuria     Hyperlipidemia     Hypertension     Insomnia     Left leg injury     Left low back pain     Nutritional counseling     Prostatitis     Radiculopathy, cervical     Right knee pain     Sinusitis, acute     Tension headache     Thoracic back pain     Urinary frequency     Wears eyeglasses        Past Surgical History:   Procedure Laterality Date    ANTERIOR CERVICAL DISCECTOMY W/ FUSION N/A 3/21/2017    Procedure: CERVICAL DISCECTOMY ANTERIOR WITH FUSION WITH INSTRUMENTATION;  Surgeon: Shadi Mckenna MD;  Location: [facility];  Service:     SHOULDER SURGERY Right     times 4       Family History   Problem Relation Age of Onset    Stroke Other     Diabetes Other     Hypertension Other     Prostate cancer Other        Social History     Tobacco Use    Smoking status: Never     Passive exposure: Never    Smokeless tobacco: Current     Types: Chew   Vaping Use    Vaping status: Never Used   Substance Use Topics    Alcohol use: Yes     Comment: OCC    Drug use: No       Allergies:  No Known Allergies    Medications:    Current Outpatient Medications:     amLODIPine (NORVASC) 10 MG tablet, Take 1 tablet by mouth Daily., Disp: 30 tablet, Rfl: 5    cetirizine (zyrTEC) 10 MG tablet, Take 1 tablet by mouth Daily., Disp: 30 tablet, Rfl: 2    cyclobenzaprine (FLEXERIL) 5 MG tablet, Take 2 tablets by mouth 3 (Three) Times a Day As Needed for Muscle Spasms., Disp: 30 tablet, Rfl: 2    losartan (COZAAR) 100 MG tablet, Take 1 tablet by mouth Daily., Disp: 30 tablet, Rfl: 5    Melatonin 10 MG tablet,  "Take 1 tablet by mouth Every Night. Try 1/2 tablet nightly 2 hours prior to bedtime. If 1/2 tablet not effective after 1 week, increase to 1 full tablet nightly., Disp: 30 tablet, Rfl: 5    metFORMIN ER (GLUCOPHAGE-XR) 500 MG 24 hr tablet, Take 2 tablets by mouth 2 (Two) Times a Day. (Patient taking differently: Take 2 tablets by mouth 2 (Two) Times a Day. Taking randomly.), Disp: 120 tablet, Rfl: 5    rosuvastatin (Crestor) 40 MG tablet, Take 1 tablet by mouth Daily. (Patient taking differently: Take 1 tablet by mouth Daily. Not taking regular.), Disp: 30 tablet, Rfl: 5    bisacodyl (Dulcolax) 5 MG EC tablet, Take 2 @ 3pm, 2 @ 7 pm day prior to colonoscopy (Patient not taking: Reported on 8/12/2024), Disp: 4 tablet, Rfl: 0    ciprofloxacin (CIPRO) 500 MG tablet, Take 1 tablet by mouth Every 12 (Twelve) Hours. (Patient not taking: Reported on 9/11/2024), Disp: , Rfl:     Lactobacillus (Acidophilus) capsule, , Disp: , Rfl:     meloxicam (MOBIC) 15 MG tablet, Take 1 tablet by mouth Daily., Disp: , Rfl:     metroNIDAZOLE (FLAGYL) 500 MG tablet, Take 1 tablet by mouth 3 times a day. (Patient not taking: Reported on 9/11/2024), Disp: , Rfl:     polyethylene glycol (MIRALAX) 17 GM/SCOOP powder, Take 17 g by mouth Daily. USE AS DIRECTED FOR BOWEL PREP, WRITTEN INSTRUCTIONS GIVEN (Patient not taking: Reported on 8/12/2024), Disp: 238 g, Rfl: 0    OBJECTIVE:    Vital Signs:   Vitals:    09/11/24 1416   BP: 130/70   Pulse: 80   Temp: 98.9 °F (37.2 °C)   SpO2: 100%   Weight: 107 kg (236 lb)   Height: 182.9 cm (72.01\")       Physical Exam:     General Appearance:    Alert, cooperative, in no acute distress   Head:    Normocephalic, without obvious abnormality, atraumatic   Eyes:            Lids and lashes normal, conjunctivae and sclerae normal, no   icterus, no pallor, corneas clear, PERRLA   Ears:    Ears appear intact with no abnormalities noted   Throat:   No oral lesions, no thrush, oral mucosa moist   Neck:   No " adenopathy, supple, trachea midline, no thyromegaly, no   carotid bruit, no JVD   Back:     No kyphosis present, no scoliosis present, no skin lesions,      erythema or scars, no tenderness to percussion or                   palpation,   range of motion normal   Lungs:     Clear to auscultation,respirations regular, even and                  unlabored    Heart:    Regular rhythm and normal rate, normal S1 and S2, no            murmur, no gallop, no rub, no click   Chest Wall:    No abnormalities observed   Abdomen:     Normal bowel sounds, no masses, no organomegaly, soft        tender in the right upper quadrant, non-distended, no guarding, there is evidence of right upper quadrant tenderness   Extremities:   Moves all extremities well, no edema, no cyanosis, no             redness   Pulses:   Pulses palpable and equal bilaterally   Skin:   No bleeding, bruising or rash   Lymph nodes:   No palpable adenopathy   Neurologic:   Cranial nerves 2 - 12 grossly intact, sensation intact, DTR       present and equal bilaterally                 Results Review:   I reviewed the patient's new clinical results.  I reviewed the patient's new imaging results and agree with the interpretation.  I reviewed the patient's other test results and agree with the interpretation    Review of Systems was reviewed and confirmed as accurate as documented by the MA.    ASSESSMENT/PLAN:    1. Biliary dyskinesia          I did have a detailed and extensive discussion with the patient in the office today and they understand that they need to undergo robotic assisted laparoscopic cholecystectomy with intraoperative cholangiography and/or immunofluorescence or possible open cholecystectomy. Full risks and benefits of operative versus nonoperative intervention were discussed with the patient and these included things such as nonresolution of symptoms and possible worsening of symptoms without surgical intervention versus infection, bleeding, open  cholecystectomy, common bile duct injury, postoperative biliary leakage, need for drain placement, possible inability to perform cholangiography due to inflammation, postoperative abscess, etc with surgical intervention. The patient understands, agrees, and wishes to proceed with the surgical treatment plan as mentioned above. The patient had no questions for me at the end of the discussion.  I did draw a picture of the anatomy for the patient and used this in my informed consent.     I discussed the patients findings and my recommendations with patient and the wife in the office today.    Electronically signed by Ishan Boggs MD  09/11/24

## 2024-09-11 ENCOUNTER — OFFICE VISIT (OUTPATIENT)
Dept: SURGERY | Facility: CLINIC | Age: 59
End: 2024-09-11
Payer: COMMERCIAL

## 2024-09-11 VITALS
SYSTOLIC BLOOD PRESSURE: 130 MMHG | HEIGHT: 72 IN | WEIGHT: 236 LBS | TEMPERATURE: 98.9 F | DIASTOLIC BLOOD PRESSURE: 70 MMHG | BODY MASS INDEX: 31.97 KG/M2 | HEART RATE: 80 BPM | OXYGEN SATURATION: 100 %

## 2024-09-11 DIAGNOSIS — K82.8 BILIARY DYSKINESIA: Primary | ICD-10-CM

## 2024-09-11 PROCEDURE — 99214 OFFICE O/P EST MOD 30 MIN: CPT | Performed by: SURGERY

## 2024-09-11 RX ORDER — CIPROFLOXACIN 500 MG/1
1 TABLET, FILM COATED ORAL EVERY 12 HOURS SCHEDULED
COMMUNITY
Start: 2024-09-03

## 2024-09-11 RX ORDER — SELENIUM 50 MCG
TABLET ORAL
COMMUNITY
Start: 2024-09-03

## 2024-09-11 RX ORDER — METRONIDAZOLE 500 MG/1
1 TABLET ORAL 3 TIMES DAILY
COMMUNITY
Start: 2024-09-03

## 2024-09-11 RX ORDER — INDOCYANINE GREEN AND WATER 25 MG
5 KIT INJECTION ONCE
OUTPATIENT
Start: 2024-09-11 | End: 2024-09-11

## 2024-09-11 RX ORDER — SODIUM CHLORIDE 9 MG/ML
100 INJECTION, SOLUTION INTRAVENOUS CONTINUOUS
OUTPATIENT
Start: 2024-09-11

## 2024-09-12 PROBLEM — K82.8 BILIARY DYSKINESIA: Status: ACTIVE | Noted: 2024-09-11

## 2024-09-16 ENCOUNTER — TELEPHONE (OUTPATIENT)
Dept: FAMILY MEDICINE CLINIC | Facility: CLINIC | Age: 59
End: 2024-09-16
Payer: COMMERCIAL

## 2024-09-30 ENCOUNTER — PRE-ADMISSION TESTING (OUTPATIENT)
Dept: PREADMISSION TESTING | Facility: HOSPITAL | Age: 59
End: 2024-09-30
Payer: COMMERCIAL

## 2024-09-30 VITALS — BODY MASS INDEX: 32.03 KG/M2 | WEIGHT: 236.2 LBS

## 2024-09-30 LAB
ANION GAP SERPL CALCULATED.3IONS-SCNC: 11.1 MMOL/L (ref 5–15)
BUN SERPL-MCNC: 20 MG/DL (ref 6–20)
BUN/CREAT SERPL: 17.7 (ref 7–25)
CALCIUM SPEC-SCNC: 9.5 MG/DL (ref 8.6–10.5)
CHLORIDE SERPL-SCNC: 104 MMOL/L (ref 98–107)
CO2 SERPL-SCNC: 23.9 MMOL/L (ref 22–29)
CREAT SERPL-MCNC: 1.13 MG/DL (ref 0.76–1.27)
DEPRECATED RDW RBC AUTO: 38.2 FL (ref 37–54)
EGFRCR SERPLBLD CKD-EPI 2021: 75.8 ML/MIN/1.73
ERYTHROCYTE [DISTWIDTH] IN BLOOD BY AUTOMATED COUNT: 13.3 % (ref 12.3–15.4)
GLUCOSE SERPL-MCNC: 138 MG/DL (ref 65–99)
HCT VFR BLD AUTO: 40.9 % (ref 37.5–51)
HGB BLD-MCNC: 13.7 G/DL (ref 13–17.7)
MCH RBC QN AUTO: 26.3 PG (ref 26.6–33)
MCHC RBC AUTO-ENTMCNC: 33.5 G/DL (ref 31.5–35.7)
MCV RBC AUTO: 78.7 FL (ref 79–97)
PLATELET # BLD AUTO: 272 10*3/MM3 (ref 140–450)
PMV BLD AUTO: 9.6 FL (ref 6–12)
POTASSIUM SERPL-SCNC: 4.5 MMOL/L (ref 3.5–5.2)
RBC # BLD AUTO: 5.2 10*6/MM3 (ref 4.14–5.8)
SODIUM SERPL-SCNC: 139 MMOL/L (ref 136–145)
WBC NRBC COR # BLD AUTO: 7.44 10*3/MM3 (ref 3.4–10.8)

## 2024-09-30 PROCEDURE — 80048 BASIC METABOLIC PNL TOTAL CA: CPT

## 2024-09-30 PROCEDURE — 85027 COMPLETE CBC AUTOMATED: CPT

## 2024-09-30 PROCEDURE — 36415 COLL VENOUS BLD VENIPUNCTURE: CPT

## 2024-09-30 NOTE — DISCHARGE INSTRUCTIONS
Pre-Admission testing appointment completed today for patient's upcoming procedure on 10/9/24 at [facility].     PAT PASS reviewed with patient and they verbalize understanding of the following:     Do not eat or drink anything after midnight the night before procedure unless otherwise instructed by physician/surgeon's office, this includes no gum, candy, mints, tobacco products or e-cigarettes.  Do not shave the area to be operated on at least 48 hours prior to procedure.  Do not wear makeup, lotion, hair products, or nail polish.  Do not wear any jewelry and remove all piercings.  Do not wear any adhesive if you wear dentures.  Do not wear contacts; bring in glasses if needed.  Only take medications on the morning of procedure as instructed by PAT nurse per anesthesia guidelines or as instructed by physician's office.  If you are on any blood thinners reach out to the physician/surgeon's office for instructions on when/if they will need to be stopped prior to procedure.  Bring in picture ID and insurance card, advanced directive copies if applicable, CPAP/BIPAP/Inhalers if indicated morning of procedure, leave any other valuables at home.  Ensure you have arranged for someone to drive you home the day of your procedure and someone to care for you at home afterwards. It is recommended that you do not drive, drink alcohol, or make any major legal decisions for at least 24 hours after your procedure is complete.   Chlorhexidine sponge along with instruction/information sheet given to patient. Readybath wipes given in lieu of CHG if patient has CHG allergy. Instructed patient to date, time, and initial the verification sheet once skin prep has been completed, and to return to [facility] the day of the procedure.       Introduction to anesthesia video watched by patient during appointment and anesthesia FAQ tip sheet given to patient.  Instructions and information given to patient about parking, hospital  entrance, and registration location.

## 2024-10-04 DIAGNOSIS — I10 PRIMARY HYPERTENSION: ICD-10-CM

## 2024-10-04 DIAGNOSIS — M25.552 CHRONIC LEFT HIP PAIN: ICD-10-CM

## 2024-10-04 DIAGNOSIS — M54.40 ACUTE MIDLINE LOW BACK PAIN WITH SCIATICA, SCIATICA LATERALITY UNSPECIFIED: ICD-10-CM

## 2024-10-04 DIAGNOSIS — G89.29 CHRONIC LEFT HIP PAIN: ICD-10-CM

## 2024-10-04 RX ORDER — CYCLOBENZAPRINE HCL 5 MG
10 TABLET ORAL 3 TIMES DAILY PRN
Qty: 30 TABLET | Refills: 12 | Status: SHIPPED | OUTPATIENT
Start: 2024-10-04

## 2024-10-04 RX ORDER — AMLODIPINE BESYLATE 10 MG/1
10 TABLET ORAL DAILY
Qty: 30 TABLET | Refills: 12 | Status: SHIPPED | OUTPATIENT
Start: 2024-10-04

## 2024-10-08 ENCOUNTER — ANESTHESIA EVENT (OUTPATIENT)
Dept: PERIOP | Facility: HOSPITAL | Age: 59
End: 2024-10-08
Payer: COMMERCIAL

## 2024-10-08 NOTE — ANESTHESIA PREPROCEDURE EVALUATION
Anesthesia Evaluation     Patient summary reviewed and Nursing notes reviewed   no history of anesthetic complications:   NPO Solid Status: > 8 hours  NPO Liquid Status: > 8 hours           Airway   Mallampati: II  TM distance: >3 FB  Neck ROM: full  no difficulty expected, Possible difficult intubation and No difficulty expected  Dental - normal exam     Pulmonary    (+) ,sleep apnea, decreased breath sounds  (-) lung cancer  Cardiovascular - normal exam    (+) hypertension, PVD, hyperlipidemia      Neuro/Psych  (+) headaches, numbness  GI/Hepatic/Renal/Endo    (+) obesity, diabetes mellitus type 2 poorly controlled    Musculoskeletal     (+) arthralgias, myalgias, neck pain  Abdominal   (+) obese    Bowel sounds: normal.   Substance History   (+) alcohol use     OB/GYN negative ob/gyn ROS         Other        ROS/Med Hx Other: Labs reviewed gluc 138   2017 EKG sr with sa                 Anesthesia Plan    ASA 2     general     (Risks and benefits discussed including risk of aspiration, recall and dental damage. All patient questions answered.    Will continue with plan of care.)  intravenous induction     Anesthetic plan, risks, benefits, and alternatives have been provided, discussed and informed consent has been obtained with: patient.  Pre-procedure education provided  Plan discussed with CRNA.

## 2024-10-09 ENCOUNTER — HOSPITAL ENCOUNTER (OUTPATIENT)
Facility: HOSPITAL | Age: 59
Setting detail: HOSPITAL OUTPATIENT SURGERY
Discharge: HOME | End: 2024-10-09
Attending: SURGERY | Admitting: SURGERY
Payer: COMMERCIAL

## 2024-10-09 ENCOUNTER — ANESTHESIA (OUTPATIENT)
Dept: PERIOP | Facility: HOSPITAL | Age: 59
End: 2024-10-09
Payer: COMMERCIAL

## 2024-10-09 VITALS
TEMPERATURE: 97.3 F | DIASTOLIC BLOOD PRESSURE: 76 MMHG | HEART RATE: 76 BPM | RESPIRATION RATE: 16 BRPM | OXYGEN SATURATION: 98 % | SYSTOLIC BLOOD PRESSURE: 110 MMHG

## 2024-10-09 DIAGNOSIS — K82.8 BILIARY DYSKINESIA: ICD-10-CM

## 2024-10-09 LAB — GLUCOSE BLDC GLUCOMTR-MCNC: 130 MG/DL (ref 70–130)

## 2024-10-09 PROCEDURE — 25010000002 HYDROMORPHONE 1 MG/ML SOLUTION: Performed by: NURSE ANESTHETIST, CERTIFIED REGISTERED

## 2024-10-09 PROCEDURE — 25010000002 DEXAMETHASONE PER 1 MG: Performed by: NURSE ANESTHETIST, CERTIFIED REGISTERED

## 2024-10-09 PROCEDURE — 25010000002 GLYCOPYRROLATE PF 0.4 MG/2ML SOLUTION: Performed by: NURSE ANESTHETIST, CERTIFIED REGISTERED

## 2024-10-09 PROCEDURE — 25010000002 MIDAZOLAM PER 1MG: Performed by: NURSE ANESTHETIST, CERTIFIED REGISTERED

## 2024-10-09 PROCEDURE — 25010000002 LIDOCAINE (CARDIAC): Performed by: NURSE ANESTHETIST, CERTIFIED REGISTERED

## 2024-10-09 PROCEDURE — 25010000002 ONDANSETRON PER 1 MG: Performed by: NURSE ANESTHETIST, CERTIFIED REGISTERED

## 2024-10-09 PROCEDURE — 25010000002 METOCLOPRAMIDE PER 10 MG: Performed by: NURSE ANESTHETIST, CERTIFIED REGISTERED

## 2024-10-09 PROCEDURE — 25010000002 CEFAZOLIN PER 500 MG: Performed by: SURGERY

## 2024-10-09 PROCEDURE — 25010000002 FENTANYL CITRATE (PF) 50 MCG/ML SOLUTION: Performed by: NURSE ANESTHETIST, CERTIFIED REGISTERED

## 2024-10-09 PROCEDURE — 25810000003 SODIUM CHLORIDE 0.9 % SOLUTION: Performed by: SURGERY

## 2024-10-09 PROCEDURE — 25010000002 INDOCYANINE GREEN 25 MG RECONSTITUTED SOLUTION: Performed by: SURGERY

## 2024-10-09 PROCEDURE — 47563 LAPARO CHOLECYSTECTOMY/GRAPH: CPT | Performed by: SURGERY

## 2024-10-09 PROCEDURE — 25010000002 BUPIVACAINE (PF) 0.5 % SOLUTION: Performed by: SURGERY

## 2024-10-09 PROCEDURE — 25010000002 PROPOFOL 10 MG/ML EMULSION: Performed by: NURSE ANESTHETIST, CERTIFIED REGISTERED

## 2024-10-09 PROCEDURE — 82948 REAGENT STRIP/BLOOD GLUCOSE: CPT

## 2024-10-09 PROCEDURE — 25810000003 LACTATED RINGERS PER 1000 ML: Performed by: NURSE ANESTHETIST, CERTIFIED REGISTERED

## 2024-10-09 DEVICE — CLIP LIG HEMOLOK PA LG 6CT PRP: Type: IMPLANTABLE DEVICE | Site: ABDOMEN | Status: FUNCTIONAL

## 2024-10-09 RX ORDER — IPRATROPIUM BROMIDE AND ALBUTEROL SULFATE 2.5; .5 MG/3ML; MG/3ML
3 SOLUTION RESPIRATORY (INHALATION) ONCE
Status: DISCONTINUED | OUTPATIENT
Start: 2024-10-09 | End: 2024-10-09 | Stop reason: HOSPADM

## 2024-10-09 RX ORDER — ONDANSETRON 2 MG/ML
INJECTION INTRAMUSCULAR; INTRAVENOUS AS NEEDED
Status: DISCONTINUED | OUTPATIENT
Start: 2024-10-09 | End: 2024-10-09 | Stop reason: SURG

## 2024-10-09 RX ORDER — EPHEDRINE SULFATE 5 MG/ML
INJECTION INTRAVENOUS AS NEEDED
Status: DISCONTINUED | OUTPATIENT
Start: 2024-10-09 | End: 2024-10-09 | Stop reason: SURG

## 2024-10-09 RX ORDER — BUPIVACAINE HCL/0.9 % NACL/PF 0.125 %
PLASTIC BAG, INJECTION (ML) EPIDURAL AS NEEDED
Status: DISCONTINUED | OUTPATIENT
Start: 2024-10-09 | End: 2024-10-09 | Stop reason: SURG

## 2024-10-09 RX ORDER — MAGNESIUM HYDROXIDE 1200 MG/15ML
LIQUID ORAL AS NEEDED
Status: DISCONTINUED | OUTPATIENT
Start: 2024-10-09 | End: 2024-10-09 | Stop reason: HOSPADM

## 2024-10-09 RX ORDER — PROPOFOL 10 MG/ML
VIAL (ML) INTRAVENOUS AS NEEDED
Status: DISCONTINUED | OUTPATIENT
Start: 2024-10-09 | End: 2024-10-09 | Stop reason: SURG

## 2024-10-09 RX ORDER — BUPIVACAINE HYDROCHLORIDE 5 MG/ML
INJECTION, SOLUTION EPIDURAL; INTRACAUDAL AS NEEDED
Status: DISCONTINUED | OUTPATIENT
Start: 2024-10-09 | End: 2024-10-09 | Stop reason: HOSPADM

## 2024-10-09 RX ORDER — SODIUM CHLORIDE 9 MG/ML
100 INJECTION, SOLUTION INTRAVENOUS CONTINUOUS
Status: DISCONTINUED | OUTPATIENT
Start: 2024-10-09 | End: 2024-10-09 | Stop reason: HOSPADM

## 2024-10-09 RX ORDER — FENTANYL CITRATE 50 UG/ML
INJECTION, SOLUTION INTRAMUSCULAR; INTRAVENOUS AS NEEDED
Status: DISCONTINUED | OUTPATIENT
Start: 2024-10-09 | End: 2024-10-09 | Stop reason: SURG

## 2024-10-09 RX ORDER — METOCLOPRAMIDE HYDROCHLORIDE 5 MG/ML
INJECTION INTRAMUSCULAR; INTRAVENOUS AS NEEDED
Status: DISCONTINUED | OUTPATIENT
Start: 2024-10-09 | End: 2024-10-09 | Stop reason: SURG

## 2024-10-09 RX ORDER — INDOCYANINE GREEN AND WATER 25 MG
5 KIT INJECTION ONCE
Status: COMPLETED | OUTPATIENT
Start: 2024-10-09 | End: 2024-10-09

## 2024-10-09 RX ORDER — ONDANSETRON 2 MG/ML
4 INJECTION INTRAMUSCULAR; INTRAVENOUS ONCE
Status: DISCONTINUED | OUTPATIENT
Start: 2024-10-09 | End: 2024-10-09 | Stop reason: HOSPADM

## 2024-10-09 RX ORDER — KETAMINE HCL IN NACL, ISO-OSM 100MG/10ML
SYRINGE (ML) INJECTION AS NEEDED
Status: DISCONTINUED | OUTPATIENT
Start: 2024-10-09 | End: 2024-10-09 | Stop reason: SURG

## 2024-10-09 RX ORDER — HYDROCODONE BITARTRATE AND ACETAMINOPHEN 7.5; 325 MG/1; MG/1
1 TABLET ORAL EVERY 6 HOURS PRN
Qty: 15 TABLET | Refills: 0 | Status: SHIPPED | OUTPATIENT
Start: 2024-10-09

## 2024-10-09 RX ORDER — ROCURONIUM BROMIDE 50 MG/5 ML
SYRINGE (ML) INTRAVENOUS AS NEEDED
Status: DISCONTINUED | OUTPATIENT
Start: 2024-10-09 | End: 2024-10-09 | Stop reason: SURG

## 2024-10-09 RX ORDER — MIDAZOLAM HYDROCHLORIDE 2 MG/2ML
INJECTION, SOLUTION INTRAMUSCULAR; INTRAVENOUS AS NEEDED
Status: DISCONTINUED | OUTPATIENT
Start: 2024-10-09 | End: 2024-10-09 | Stop reason: SURG

## 2024-10-09 RX ORDER — LORAZEPAM 2 MG/ML
1 INJECTION INTRAMUSCULAR ONCE
Status: DISCONTINUED | OUTPATIENT
Start: 2024-10-09 | End: 2024-10-09 | Stop reason: HOSPADM

## 2024-10-09 RX ORDER — SODIUM CHLORIDE, SODIUM LACTATE, POTASSIUM CHLORIDE, CALCIUM CHLORIDE 600; 310; 30; 20 MG/100ML; MG/100ML; MG/100ML; MG/100ML
INJECTION, SOLUTION INTRAVENOUS CONTINUOUS PRN
Status: DISCONTINUED | OUTPATIENT
Start: 2024-10-09 | End: 2024-10-09 | Stop reason: SURG

## 2024-10-09 RX ORDER — DEXAMETHASONE SODIUM PHOSPHATE 4 MG/ML
INJECTION, SOLUTION INTRA-ARTICULAR; INTRALESIONAL; INTRAMUSCULAR; INTRAVENOUS; SOFT TISSUE AS NEEDED
Status: DISCONTINUED | OUTPATIENT
Start: 2024-10-09 | End: 2024-10-09 | Stop reason: SURG

## 2024-10-09 RX ADMIN — EPHEDRINE SULFATE 10 MG: 5 INJECTION INTRAVENOUS at 08:30

## 2024-10-09 RX ADMIN — ONDANSETRON 4 MG: 2 INJECTION INTRAMUSCULAR; INTRAVENOUS at 07:35

## 2024-10-09 RX ADMIN — Medication 40 MG: at 07:49

## 2024-10-09 RX ADMIN — MIDAZOLAM HYDROCHLORIDE 2 MG: 1 INJECTION, SOLUTION INTRAMUSCULAR; INTRAVENOUS at 07:31

## 2024-10-09 RX ADMIN — METOCLOPRAMIDE HYDROCHLORIDE 5 MG: 5 INJECTION, SOLUTION INTRAMUSCULAR; INTRAVENOUS at 07:33

## 2024-10-09 RX ADMIN — Medication 25 MG: at 07:41

## 2024-10-09 RX ADMIN — FENTANYL CITRATE 50 MCG: 50 INJECTION, SOLUTION INTRAMUSCULAR; INTRAVENOUS at 07:43

## 2024-10-09 RX ADMIN — PROPOFOL 150 MG: 10 INJECTION, EMULSION INTRAVENOUS at 07:49

## 2024-10-09 RX ADMIN — SODIUM CHLORIDE 100 ML/HR: 9 INJECTION, SOLUTION INTRAVENOUS at 07:23

## 2024-10-09 RX ADMIN — GLYCOPYRROLATE 0.2 MCG: 0.2 INJECTION, SOLUTION INTRAMUSCULAR; INTRAVENOUS at 07:29

## 2024-10-09 RX ADMIN — SODIUM CHLORIDE 2000 MG: 9 INJECTION, SOLUTION INTRAVENOUS at 07:29

## 2024-10-09 RX ADMIN — Medication 20 MG: at 07:51

## 2024-10-09 RX ADMIN — HYDROMORPHONE HYDROCHLORIDE 0.5 MG: 1 INJECTION, SOLUTION INTRAMUSCULAR; INTRAVENOUS; SUBCUTANEOUS at 09:27

## 2024-10-09 RX ADMIN — Medication 25 MG: at 07:51

## 2024-10-09 RX ADMIN — SODIUM CHLORIDE, POTASSIUM CHLORIDE, SODIUM LACTATE AND CALCIUM CHLORIDE: 600; 310; 30; 20 INJECTION, SOLUTION INTRAVENOUS at 07:15

## 2024-10-09 RX ADMIN — FENTANYL CITRATE 50 MCG: 50 INJECTION, SOLUTION INTRAMUSCULAR; INTRAVENOUS at 07:49

## 2024-10-09 RX ADMIN — EPHEDRINE SULFATE 10 MG: 5 INJECTION INTRAVENOUS at 08:13

## 2024-10-09 RX ADMIN — LIDOCAINE HYDROCHLORIDE 40 MG: 20 INJECTION, SOLUTION INTRAVENOUS at 07:48

## 2024-10-09 RX ADMIN — HYDROMORPHONE HYDROCHLORIDE 0.5 MG: 1 INJECTION, SOLUTION INTRAMUSCULAR; INTRAVENOUS; SUBCUTANEOUS at 09:40

## 2024-10-09 RX ADMIN — HYDROMORPHONE HYDROCHLORIDE 0.5 MG: 1 INJECTION, SOLUTION INTRAMUSCULAR; INTRAVENOUS; SUBCUTANEOUS at 09:11

## 2024-10-09 RX ADMIN — Medication 200 MCG: at 07:51

## 2024-10-09 RX ADMIN — INDOCYANINE GREEN AND WATER 5 MG: KIT at 07:31

## 2024-10-09 RX ADMIN — DEXAMETHASONE SODIUM PHOSPHATE 10 MG: 4 INJECTION, SOLUTION INTRA-ARTICULAR; INTRALESIONAL; INTRAMUSCULAR; INTRAVENOUS; SOFT TISSUE at 07:34

## 2024-10-09 NOTE — ANESTHESIA PROCEDURE NOTES
Airway  Urgency: elective    Date/Time: 10/9/2024 7:10 AM  Airway not difficult    General Information and Staff    Patient location during procedure: OR  CRNA/CAA: Patricio Dasilva CRNA    Indications and Patient Condition  Indications for airway management: airway protection    Preoxygenated: yes  Mask difficulty assessment: 1 - vent by mask    Final Airway Details  Final airway type: endotracheal airway      Successful airway: ETT  Cuffed: yes   Successful intubation technique: direct laryngoscopy  Facilitating devices/methods: intubating stylet  Endotracheal tube insertion site: oral  Blade: Glidescope (large blade)  Blade size: 3  ETT size (mm): 7.5  Cormack-Lehane Classification: grade III - view of epiglottis only (with 4 mac)  Placement verified by: chest auscultation, capnometry and palpation of cuff   Inital cuff pressure (cm H2O): 0  Cuff volume (mL): 5  Measured from: lips  ETT/EBT  to lips (cm): 21  Number of attempts at approach: 1  Assessment: lips, teeth, and gum same as pre-op and atraumatic intubation    Additional Comments  Risks and benefits discussed including risk of aspiration, recall and dental damage. All patient questions answered.    Will continue with plan of care.

## 2024-10-09 NOTE — OP NOTE
PATIENT:     Kelton Escamilla    DATE OF SURGERY:     10/9/2024    PHYSICIAN:   Ishan Boggs MD    REFERRING PHYSICIAN:  Ángel Lopez MD    YOB: 1965    PREOPERATIVE DIAGNOSIS:  Acute and chronic cholecystitis due to biliary dyskinesia    POSTOPERATIVE DIAGNOSIS:  Acute and chronic cholecystitis due to biliary dyskinesia    PROCEDURE:  Robotic assisted laparoscopic cholecystectomy with Immunoflorescence    EBL:  Less than 50 cc    COMPLICATIONS:  None    ANESTHESIA:  General endotracheal    SPECIMEN:  Gallbladder, sent to Pathology    HISTORY:  The patient was sent to me as a consultation via Ángel Lopez MD for evaluation and treatment of chronic right upper quadrant and mid epigastric abdominal discomfort.  Workup was begun and the patient was subsequently found to have chronic cholecystitis due to biliary dyskinesia.  The patient is here now today for elective robotic cholecystectomy with immunoflorescence for treatment of chronic cholecystitis.  The procedure was discussed with the patient preoperatively who understands, agrees, and wishes to proceed with the above-mentioned procedure in an elective outpatient fashion.      OPERATIVE PROCEDURE:  The patient was taken to the operating room, placed in the supine position, and given general endotracheal anesthesia.  The patient was prepped and draped in the normal sterile fashion, and also received preoperative IV antibiotics.  An appropriate timeout was performed by the nursing staff prior to the incision.  I did discuss the situation with the patient preoperatively.      An umbilical incision was then made to insert a Veress needle to insufflate the abdomen with carbon dioxide, and a separate 8 mm port was inserted here along with a camera via an Optiview.  Separate 8 mm port sites were inserted in the left upper quadrant x 2 and right upper quadrant x 1.  The robotic camera was then inserted and targeting was  performed.  We carefully inserted all instruments under direct vision with the robotic camera.    Good exposure was obtained, and the gallbladder was grasped at its infundibulum and its fundus and retracted superiorly and laterally.  There were some acute and chronic attachments of fatty tissue to the gallbladder indicative of acute and chronic cholecystitis and these were easily taken down with the robotic bovie electrocautery.    Good exposure was obtained and dissection was performed on the neck of the gallbladder, carefully taking down the peritoneum on the gallbladder itself, and the cystic duct and cystic artery were identified in the normal manner.  Firefly was used to identify the common bile duct and the cystic duct at multiple times during this procedure.  A robotic clip was then placed on the cystic duct proximally and distally, then a clip was placed on the cystic artery proximally and the cystic artery was divided with bovie electrocautery.  There was noted to be edema in the wall of the gallbladder indicative of acute and chronic cholecystitis.    The cystic artery and the cystic duct were divided with the bovie cut under direct vision.  We then used the bovie to carefully remove the gallbladder from the liver bed without difficulty, again utilizing Firefly to make sure that there were no aberrant ductal structures. We were then able to place the gallbladder in an endobag and this was eventually removed via the left upper quadrant port site.    Irrigation was used in the patient’s abdominal cavity.  It was clean and dry at this point.  Hemostasis was intact and there was no evidence of bilious leakage.  All trocar sites were injected with a local anesthetic mixture.  The robot was undocked, the trocars were removed under direct vision and 4-0 Vicryl subcuticular stitch along with Steri-Strips for skin reapproximation.        The patient was stable at this point and subsequently transferred back to the  recovery room in stable condition.    PLAN:  I did have a detailed conversation with the patient's family member Catherine [phone] postoperatively, they understand the patient's current condition, the operative findings, and the postoperative treatment plan.      Ishan Boggs MD

## 2024-10-09 NOTE — ANESTHESIA POSTPROCEDURE EVALUATION
Patient: Kelton Escamilla    Procedure Summary       Date: 10/09/24 Room / Location: [facility] OR 2 /  [facility]    Anesthesia Start: 0729 Anesthesia Stop: 0856    Procedure: ROBOTIC ASSISTED LAPAROSCOPIC CHOLECYSTECTOMY WITH IMMUNOFLORESCENCE (Abdomen) Diagnosis:       Biliary dyskinesia      (Biliary dyskinesia [K82.8])    Surgeons: Ishan Boggs MD Provider: Patricio Dasilva CRNA    Anesthesia Type: general ASA Status: 2            Anesthesia Type: general    Vitals  Vitals Value Taken Time   /74 10/09/24 0853   Temp     Pulse 86 10/09/24 0855   Resp     SpO2 100 % 10/09/24 0855   Vitals shown include unfiled device data.        Post Anesthesia Care and Evaluation    Patient location during evaluation: PACU  Patient participation: complete - patient participated  Level of consciousness: awake  Pain score: 0  Pain management: adequate    Airway patency: patent  Anesthetic complications: No anesthetic complications  PONV Status: none  Cardiovascular status: acceptable  Respiratory status: acceptable, face mask and spontaneous ventilation  Hydration status: acceptable    Comments: See R.N. note for postop vital signs.vsss resp spont, reflexes intact, responsive, report given to pacu nurse

## 2024-10-09 NOTE — DISCHARGE INSTRUCTIONS
No pushing, pulling, tugging,  heavy lifting, or strenuous activity.  No major decision making, driving, or drinking alcoholic beverages for 24 hours. ( due to the medications you have  received)  Always use good hand hygiene/washing techniques.  NO driving while taking pain medications.    * if you have an incision:  Check your incision area every day for signs of infection.   Check for:  * more redness, swelling, or pain  *more fluid or blood  *warmth  *pus or bad smell.    To assist you in voiding:  Drink plenty of fluids  Listen to running water while attempting to void.    If you are unable to urinate and you have an uncomfortable urge to void or it has been   6 hours since you were discharged, return to the Emergency Room.    May splint incision sites when moving, coughing, sneezing, laughing, or increasing activity as comfort measure.      Apply ice to incision sites as directed per physician, remove, and reapply.  Do not use ice continuously.

## 2024-10-11 LAB — REF LAB TEST METHOD: NORMAL

## 2024-10-16 NOTE — PROGRESS NOTES
"Patient: Kelton Escamilla    YOB: 1965    Date: 10/23/2024    Primary Care Provider: Ángel Lopez MD    Chief Complaint   Patient presents with    Post-op Follow-up     Lap zacarias        History of present illness:  I saw the patient in the office today as a followup from their recent laparoscopic cholecystectomy which was performed 10/09/2024.  Pathology report showed chronic cholecystitis and cholelithiasis as well as a fragment of benign capsular liver.  They state that they have done well and are having no complaints.    Vital Signs:   Vitals:    10/23/24 1242   Pulse: 97   Temp: 98.4 °F (36.9 °C)   SpO2: 97%   Weight: 107 kg (236 lb)   Height: 182.9 cm (72.01\")       Physical Exam:     General : no acute distress  Chest : clear bilaterally  COR : regular rate and rhythm  ABD :  soft nontender, all incisions healed nicely    Assessment / Plan :    1. Post-operative state        I did discuss the situation with the patient today in the office and they have done well from their recent laparoscopic cholecystectomy.  I have released the patient back to normal activity, they understand that they need to be careful about heavy lifting.  I need to see the patient back in the office only if they are having further problems, they know to call me if they are.    Electronically signed by Ishan Boggs MD  10/23/24                  "

## 2024-10-23 ENCOUNTER — OFFICE VISIT (OUTPATIENT)
Dept: SURGERY | Facility: CLINIC | Age: 59
End: 2024-10-23
Payer: COMMERCIAL

## 2024-10-23 VITALS
TEMPERATURE: 98.4 F | WEIGHT: 236 LBS | BODY MASS INDEX: 31.97 KG/M2 | OXYGEN SATURATION: 97 % | HEART RATE: 97 BPM | HEIGHT: 72 IN

## 2024-10-23 DIAGNOSIS — E78.2 MIXED HYPERLIPIDEMIA: ICD-10-CM

## 2024-10-23 DIAGNOSIS — Z98.890 POST-OPERATIVE STATE: Primary | ICD-10-CM

## 2024-10-23 PROCEDURE — 99024 POSTOP FOLLOW-UP VISIT: CPT | Performed by: SURGERY

## 2024-10-23 RX ORDER — ROSUVASTATIN CALCIUM 40 MG/1
40 TABLET, COATED ORAL DAILY
Qty: 90 TABLET | Refills: 1 | Status: SHIPPED | OUTPATIENT
Start: 2024-10-23

## 2024-10-25 DIAGNOSIS — I10 PRIMARY HYPERTENSION: ICD-10-CM

## 2024-10-26 RX ORDER — LOSARTAN POTASSIUM 100 MG/1
100 TABLET ORAL DAILY
Qty: 30 TABLET | Refills: 0 | Status: SHIPPED | OUTPATIENT
Start: 2024-10-26

## 2024-11-22 DIAGNOSIS — I10 PRIMARY HYPERTENSION: ICD-10-CM

## 2024-11-23 RX ORDER — LOSARTAN POTASSIUM 100 MG/1
100 TABLET ORAL DAILY
Qty: 30 TABLET | Refills: 0 | Status: SHIPPED | OUTPATIENT
Start: 2024-11-23

## 2024-12-06 ENCOUNTER — TELEPHONE (OUTPATIENT)
Dept: FAMILY MEDICINE CLINIC | Facility: CLINIC | Age: 59
End: 2024-12-06
Payer: COMMERCIAL

## 2024-12-20 ENCOUNTER — PATIENT ROUNDING (BHMG ONLY) (OUTPATIENT)
Dept: FAMILY MEDICINE CLINIC | Facility: CLINIC | Age: 59
End: 2024-12-20
Payer: COMMERCIAL

## 2024-12-20 ENCOUNTER — OFFICE VISIT (OUTPATIENT)
Dept: FAMILY MEDICINE CLINIC | Facility: CLINIC | Age: 59
End: 2024-12-20
Payer: COMMERCIAL

## 2024-12-20 VITALS
DIASTOLIC BLOOD PRESSURE: 80 MMHG | BODY MASS INDEX: 31.83 KG/M2 | TEMPERATURE: 98 F | OXYGEN SATURATION: 96 % | RESPIRATION RATE: 18 BRPM | HEIGHT: 72 IN | WEIGHT: 235 LBS | HEART RATE: 63 BPM | SYSTOLIC BLOOD PRESSURE: 130 MMHG

## 2024-12-20 DIAGNOSIS — M25.552 PAIN OF LEFT HIP: Primary | ICD-10-CM

## 2024-12-20 PROCEDURE — 99213 OFFICE O/P EST LOW 20 MIN: CPT | Performed by: NURSE PRACTITIONER

## 2024-12-20 RX ORDER — MELOXICAM 15 MG/1
15 TABLET ORAL AS NEEDED
Qty: 90 TABLET | Refills: 1 | Status: SHIPPED | OUTPATIENT
Start: 2024-12-20

## 2024-12-20 RX ORDER — METHYLPREDNISOLONE 4 MG/1
TABLET ORAL
Qty: 21 TABLET | Refills: 0 | Status: SHIPPED | OUTPATIENT
Start: 2024-12-20 | End: 2024-12-25

## 2024-12-20 NOTE — PROGRESS NOTES
Office Note     Name: Kelton Escamilla    : 1965     MRN: [id number]     Chief Complaint  Hip Pain    Subjective     History of Present Illness:  Kelton Escamilla is a 59 y.o. male who presents today for left hip pain.   History of Present Illness  The patient presents for evaluation of hip pain.    He reports experiencing daily hip pain, predominantly in the left hip, with occasional discomfort in the right hip. This has been a persistent issue for approximately 1.5 years. He does not recall any specific injury that could have precipitated this condition. Dr. French had previously recommended further diagnostic testing and x-rays, but he is uncertain about the presence of bone spurs. 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 He does not experience any joint stiffness or redness in his wrists, elbows, shoulders, or hips, but notes that his hips and shoulders are particularly sensitive to weather changes. He has undergone 4 shoulder surgeries and believes that the current wear and tear on his body may be a compensatory response to these past procedures. He does not report any other joint issues or finger joint stiffness. Morning stiffness is limited to his hips, which significantly impairs his ability to perform basic tasks such as tying his shoes or getting out of bed. He rates his current left hip pain as 7 out of 10, but notes that it can escalate to 10 out of 10 upon waking, rendering him unable to get out of bed without assistance. He does not experience any radiating leg pain, weakness, numbness, or changes in bowel or bladder function. He also does not report any spinal soreness. He has been managing his pain with Mobic and Advil, taken every mornings, not currently taking Advil. He is unsure if he has been taking meloxicam and does not recall its efficacy.    Supplemental Information  He has a history of  cortisone injections and steroid use for his shoulder. He is currently not on any antibiotics. He is almost diabetic and is on metformin. He has not checked his blood sugar levels recently.    SOCIAL HISTORY  He was a .    FAMILY HISTORY  Negative for hip problems or autoimmune disorders.    MEDICATIONS  Current: Mobic, Advil  Past: Cipro    Review of Systems:   Review of Systems   Musculoskeletal:  Positive for arthralgias.        Hip pain       Past Medical History:   Past Medical History:   Diagnosis Date    Acute bronchitis     history of    Allergic rhinitis, seasonal     Cervical stenosis of spine     Cervicalgia     Concussion     Diabetes     DISH (diffuse idiopathic skeletal hyperostosis)     Generalized headache     pt denies having any regular headaches - 9/30/24    Hyperlipidemia     Hypertension     Insomnia     Left leg injury     Left low back pain     Prostatitis     Radiculopathy, cervical     Right knee pain     Thoracic back pain     Urinary frequency     Wears eyeglasses        Past Surgical History:   Past Surgical History:   Procedure Laterality Date    ANTERIOR CERVICAL DISCECTOMY W/ FUSION N/A 03/21/2017    Procedure: CERVICAL DISCECTOMY ANTERIOR WITH FUSION WITH INSTRUMENTATION;  Surgeon: Shadi Mckenna MD;  Location: [facility] OR;  Service:     CHOLECYSTECTOMY N/A 10/9/2024    Procedure: ROBOTIC ASSISTED LAPAROSCOPIC CHOLECYSTECTOMY WITH IMMUNOFLORESCENCE;  Surgeon: Ishan Boggs MD;  Location: [facility] OR;  Service: Robotics - DaVinci;  Laterality: N/A;    COLONOSCOPY      KNEE ARTHROSCOPY Right     SHOULDER SURGERY Right     times 4       Immunizations:   Immunization History   Administered Date(s) Administered    COVID-19 (MODERNA) 1st,2nd,3rd Dose Monovalent 12/30/2020, 01/29/2021, 01/14/2022    Fluzone (or Fluarix & Flulaval for VFC) >6mos 10/27/2020, 11/06/2023    Influenza, Unspecified 10/27/2020    Pneumococcal Conjugate 20-Valent (PCV20) 09/26/2023    Tdap 09/26/2023         Medications:     Current Outpatient Medications:     amLODIPine (NORVASC) 10 MG tablet, TAKE 1 TABLET BY MOUTH DAILY., Disp: 30 tablet, Rfl: 12    bisacodyl (Dulcolax) 5 MG EC tablet, Take 2 @ 3pm, 2 @ 7 pm day prior to colonoscopy, Disp: 4 tablet, Rfl: 0    cyclobenzaprine (FLEXERIL) 5 MG tablet, TAKE 2 TABLETS BY MOUTH 3 (THREE) TIMES A DAY AS NEEDED FOR MUSCLE SPASMS., Disp: 30 tablet, Rfl: 12    HYDROcodone-acetaminophen (NORCO) 7.5-325 MG per tablet, Take 1 tablet by mouth Every 6 (Six) Hours As Needed for Moderate Pain., Disp: 15 tablet, Rfl: 0    losartan (COZAAR) 100 MG tablet, TAKE 1 TABLET BY MOUTH DAILY. APPOINTMENT NEEDED AROUND 11/10/2024., Disp: 30 tablet, Rfl: 0    meloxicam (MOBIC) 15 MG tablet, Take 1 tablet by mouth As Needed for Moderate Pain (bilateral hip pain)., Disp: 90 tablet, Rfl: 1    metFORMIN ER (GLUCOPHAGE-XR) 500 MG 24 hr tablet, Take 2 tablets by mouth 2 (Two) Times a Day. (Patient taking differently: Take 2 tablets by mouth 2 (Two) Times a Day. Taking randomly.), Disp: 120 tablet, Rfl: 5    metroNIDAZOLE (FLAGYL) 500 MG tablet, Take 1 tablet by mouth 3 times a day., Disp: , Rfl:     polyethylene glycol (MIRALAX) 17 GM/SCOOP powder, Take 17 g by mouth Daily. USE AS DIRECTED FOR BOWEL PREP, WRITTEN INSTRUCTIONS GIVEN, Disp: 238 g, Rfl: 0    rosuvastatin (CRESTOR) 40 MG tablet, TAKE 1 TABLET BY MOUTH DAILY., Disp: 90 tablet, Rfl: 1    Allergies:   No Known Allergies    Family History:   Family History   Problem Relation Age of Onset    Stroke Other     Diabetes Other     Hypertension Other     Prostate cancer Other        Social History:   Social History     Socioeconomic History    Marital status:    Tobacco Use    Smoking status: Never     Passive exposure: Never    Smokeless tobacco: Current     Types: Snuff   Vaping Use    Vaping status: Never Used   Substance and Sexual Activity    Alcohol use: Yes     Comment: OCCasionally    Drug use: No    Sexual activity: Defer      "Partners: Female     Comment: spouse         Objective     Vital Signs  /80 (BP Location: Right arm, Patient Position: Sitting, Cuff Size: Adult)   Pulse 63   Temp 98 °F (36.7 °C) (Temporal)   Resp 18   Ht 182.9 cm (72.01\")   Wt 107 kg (235 lb)   SpO2 96%   BMI 31.86 kg/m²   Estimated body mass index is 31.86 kg/m² as calculated from the following:    Height as of this encounter: 182.9 cm (72.01\").    Weight as of this encounter: 107 kg (235 lb).        Physical Exam  Vitals and nursing note reviewed.   Constitutional:       General: He is not in acute distress.     Appearance: Normal appearance. He is not ill-appearing or toxic-appearing.   HENT:      Head: Normocephalic and atraumatic.   Eyes:      General: No scleral icterus.        Right eye: No discharge.         Left eye: No discharge.      Conjunctiva/sclera: Conjunctivae normal.   Musculoskeletal:      Cervical back: Normal range of motion and neck supple.      Comments: No deformity of the left hip palpated. Non-tender to examination. Patient verbalizes pain at the lateral posterior hip with range of motion of the hip. Increase pain with external and internal rotation. No groin pain.  Normal flexion and extension.    Skin:     General: Skin is warm.   Neurological:      General: No focal deficit present.      Mental Status: He is alert.   Psychiatric:         Mood and Affect: Mood normal.         Behavior: Behavior normal.         Thought Content: Thought content normal.         Judgment: Judgment normal.          Assessment and Plan     Procedures      Lab Results (last 72 hours)       ** No results found for the last 72 hours. **               Diagnoses and all orders for this visit:    1. Pain of left hip (Primary)  -     meloxicam (MOBIC) 15 MG tablet; Take 1 tablet by mouth As Needed for Moderate Pain (bilateral hip pain).  Dispense: 90 tablet; Refill: 1  -     methylPREDNISolone (MEDROL) 4 MG dose pack; Take as directed on package " instructions.  Dispense: 21 tablet; Refill: 0        Assessment & Plan  1. Hip pain.  The patient's hip pain is likely attributable to degenerative changes, as evidenced by the x-ray findings of mild joint space loss in the left hip and degenerative changes in the pubic symphysis and SI joint. The pain is more pronounced in the left hip but occasionally affects the right hip. He reports significant pain in the morning, reaching up to a 10 on the pain scale, and difficulty performing daily activities such as tying his shoes or getting out of bed. He has taken Advil in the past but not currently. He is advised against concurrent use of over-the-counter anti-inflammatories while on meloxicam. A short course of prednisone will be initiated to manage inflammation and alleviate pain. Potential side effects of prednisone, including increased thirst and potential sleep disturbances, have been discussed. He is also advised to consider physical therapy for stretching exercises to aid in musculoskeletal health. A prescription for a 90-day supply of meloxicam has been provided, with instructions to use it as needed post completion of the steroid course. If symptoms persist, a referral to an orthopedic specialist will be considered.    PROCEDURE  The patient has a history of 4 shoulder surgeries.      Follow Up  Return if symptoms worsen or fail to improve.    Patient or patient representative verbalized consent for the use of Ambient Listening during the visit with  BRAYDEN Olivo for chart documentation. 12/26/2024  09:13 BRAYDEN Meeks [facility]  [address] [place] [address] [address] [place] [id number] [phone]

## 2024-12-20 NOTE — PROGRESS NOTES
A [employer] message has been sent to the patient for PATIENT  ROUNDING with [facility]      negative

## 2024-12-30 ENCOUNTER — OFFICE VISIT (OUTPATIENT)
Dept: FAMILY MEDICINE CLINIC | Facility: CLINIC | Age: 59
End: 2024-12-30
Payer: COMMERCIAL

## 2024-12-30 VITALS
RESPIRATION RATE: 16 BRPM | HEART RATE: 74 BPM | SYSTOLIC BLOOD PRESSURE: 136 MMHG | TEMPERATURE: 97.8 F | WEIGHT: 238.8 LBS | OXYGEN SATURATION: 97 % | DIASTOLIC BLOOD PRESSURE: 86 MMHG | BODY MASS INDEX: 32.34 KG/M2 | HEIGHT: 72 IN

## 2024-12-30 DIAGNOSIS — R06.02 SHORTNESS OF BREATH: ICD-10-CM

## 2024-12-30 DIAGNOSIS — R68.89 FLU-LIKE SYMPTOMS: Primary | ICD-10-CM

## 2024-12-30 DIAGNOSIS — J18.9 PNEUMONIA OF RIGHT LOWER LOBE DUE TO INFECTIOUS ORGANISM: ICD-10-CM

## 2024-12-30 LAB
EXPIRATION DATE: NORMAL
FLUAV AG UPPER RESP QL IA.RAPID: NOT DETECTED
FLUBV AG UPPER RESP QL IA.RAPID: NOT DETECTED
INTERNAL CONTROL: NORMAL
Lab: NORMAL
SARS-COV-2 AG UPPER RESP QL IA.RAPID: NOT DETECTED

## 2024-12-30 PROCEDURE — 99213 OFFICE O/P EST LOW 20 MIN: CPT | Performed by: PHYSICIAN ASSISTANT

## 2024-12-30 PROCEDURE — 87428 SARSCOV & INF VIR A&B AG IA: CPT | Performed by: PHYSICIAN ASSISTANT

## 2024-12-30 RX ORDER — DOXYCYCLINE 100 MG/1
100 CAPSULE ORAL 2 TIMES DAILY
Qty: 20 CAPSULE | Refills: 0 | Status: SHIPPED | OUTPATIENT
Start: 2024-12-30

## 2024-12-30 NOTE — PROGRESS NOTES
Follow Up Office Visit      Date: 2024   Patient Name: Kelton Escamilla  : 1965   MRN: [id number]     Chief Complaint:    Chief Complaint   Patient presents with    Cough     X 6 days.    Fatigue    Fever    Shortness of Breath       History of Present Illness: Kelton Escamilla is a 59 y.o. male who is here today for .  History of Present Illness  The patient presents for evaluation of a cough.    His  symptoms began 4 days ago on [place] with an initial cough that has since subsided, transitioning into generalized body aches. He spent the entirety of [place] bedridden due to these symptoms. He reports a sensation of clamminess and heat but does not report any recent exposure to sick individuals. A swab test was conducted. He  experiences shortness of breath, particularly when lying down, accompanied by a rattling sound during respiration. His cough is productive, yielding significant amounts of phlegm. The intensity of his coughing episodes led him to suspect a pulled muscle in his back, which was causing severe pain as of yesterday but appears to have resolved today. He also reports experiencing pain on the right side during respiration. He has a history of annual bronchitis episodes.       Subjective      Review of Systems:   Review of Systems   Constitutional:  Positive for activity change, fatigue and fever.   HENT:  Positive for congestion, postnasal drip, rhinorrhea and sinus pressure.    Respiratory:  Positive for cough, shortness of breath and stridor.    Cardiovascular:  Positive for chest pain (right sided).   Gastrointestinal: Negative.      I have reviewed the patients family history, social history, past medical history, past surgical history and have updated it as appropriate.     Medications:     Current Outpatient Medications:     amLODIPine (NORVASC) 10 MG tablet, TAKE 1 TABLET BY MOUTH DAILY., Disp: 30 tablet, Rfl: 12    bisacodyl (Dulcolax) 5 MG EC tablet, Take 2 @ 3pm, 2  "@ 7 pm day prior to colonoscopy, Disp: 4 tablet, Rfl: 0    cyclobenzaprine (FLEXERIL) 5 MG tablet, TAKE 2 TABLETS BY MOUTH 3 (THREE) TIMES A DAY AS NEEDED FOR MUSCLE SPASMS., Disp: 30 tablet, Rfl: 12    HYDROcodone-acetaminophen (NORCO) 7.5-325 MG per tablet, Take 1 tablet by mouth Every 6 (Six) Hours As Needed for Moderate Pain., Disp: 15 tablet, Rfl: 0    losartan (COZAAR) 100 MG tablet, TAKE 1 TABLET BY MOUTH DAILY. APPOINTMENT NEEDED AROUND 11/10/2024., Disp: 30 tablet, Rfl: 0    meloxicam (MOBIC) 15 MG tablet, Take 1 tablet by mouth As Needed for Moderate Pain (bilateral hip pain)., Disp: 90 tablet, Rfl: 1    metFORMIN ER (GLUCOPHAGE-XR) 500 MG 24 hr tablet, Take 2 tablets by mouth 2 (Two) Times a Day. (Patient taking differently: Take 2 tablets by mouth 2 (Two) Times a Day. Taking randomly.), Disp: 120 tablet, Rfl: 5    metroNIDAZOLE (FLAGYL) 500 MG tablet, Take 1 tablet by mouth 3 times a day., Disp: , Rfl:     polyethylene glycol (MIRALAX) 17 GM/SCOOP powder, Take 17 g by mouth Daily. USE AS DIRECTED FOR BOWEL PREP, WRITTEN INSTRUCTIONS GIVEN, Disp: 238 g, Rfl: 0    rosuvastatin (CRESTOR) 40 MG tablet, TAKE 1 TABLET BY MOUTH DAILY., Disp: 90 tablet, Rfl: 1    Allergies:   No Known Allergies    Objective     Physical Exam: Please see above  Vital Signs:   Vitals:    12/30/24 1142 12/30/24 1144   BP: 146/84 136/86   BP Location: Left arm Left arm   Patient Position: Sitting Sitting   Cuff Size: Large Adult Large Adult   Pulse: 74    Resp: 16    Temp: 97.8 °F (36.6 °C)    TempSrc: Temporal    SpO2: 97%    Weight: 108 kg (238 lb 12.8 oz)    Height: 182.9 cm (72\")      Body mass index is 32.39 kg/m².    Physical Exam  Vitals and nursing note reviewed.   Constitutional:       General: He is not in acute distress.     Appearance: Normal appearance. He is not ill-appearing or toxic-appearing.   HENT:      Mouth/Throat:      Mouth: Mucous membranes are moist.      Pharynx: No oropharyngeal exudate or posterior " oropharyngeal erythema.   Eyes:      Extraocular Movements: Extraocular movements intact.      Pupils: Pupils are equal, round, and reactive to light.   Cardiovascular:      Rate and Rhythm: Normal rate and regular rhythm.      Heart sounds: No murmur heard.     No friction rub. No gallop.   Pulmonary:      Effort: Pulmonary effort is normal.      Breath sounds: Rales (RLL) present.   Neurological:      Mental Status: He is alert.     Procedures    Assessment / Plan      Assessment/Plan:   1. Flu-like symptoms  The patient's symptoms began 4 days ago with a cough, which progressed to body aches and shortness of breath. He reports a rattling sound when breathing and significant phlegm production. There is also pain on the right side when breathing, which may indicate a pulled muscle or pneumonia. His oxygen level is normal, and there is no fever currently, although he feels clammy and hot. Given the symptoms and the current prevalence of influenza, a viral etiology is suspected. Swabs are negative.  - POCT SARS-CoV-2 + Flu Antigen BRENDAN       Assessment & Plan  1. Cough.  The patient's symptoms began 4 days ago with a cough, which progressed to body aches and shortness of breath. He reports a rattling sound when breathing and significant phlegm production. There is also pain on the right side when breathing, which may indicate a pulled muscle or pneumonia. His oxygen level is normal, and there is no fever currently, although he feels clammy and hot. Given the symptoms and the current prevalence of influenza, a viral etiology is suspected.    Follow Up:   No follow-ups on file.      At [facility], we believe that sharing information builds trust and better relationships. You are receiving this note because you recently visited [facility]. It is possible you will see health information before a provider has talked with you about it. This kind of information can be easy to misunderstand. To help you fully  understand what it means for your health, we urge you to discuss this note with your provider.    Patient or patient representative verbalized consent for the use of Ambient Listening during the visit with  Jinny Lopez PA-C for chart documentation. 12/31/2024  12:55 EST     Jinny Lopez PA-C  [facility]

## 2025-01-09 ENCOUNTER — TELEPHONE (OUTPATIENT)
Dept: FAMILY MEDICINE CLINIC | Facility: CLINIC | Age: 60
End: 2025-01-09
Payer: COMMERCIAL

## 2025-01-09 DIAGNOSIS — J18.9 PNEUMONIA OF RIGHT LOWER LOBE DUE TO INFECTIOUS ORGANISM: ICD-10-CM

## 2025-01-09 RX ORDER — DOXYCYCLINE 100 MG/1
100 CAPSULE ORAL 2 TIMES DAILY
Qty: 20 CAPSULE | Refills: 0 | Status: SHIPPED | OUTPATIENT
Start: 2025-01-09

## 2025-01-09 NOTE — TELEPHONE ENCOUNTER
SAW IRIS LAST WEEK, HAS COMPLETED ANTIBIOTIC, NOT OVER % WANTS ANOTHER ROUND OF ANTIBOTICS, TOBIN  CALL BACK

## (undated) DEVICE — AIRWY 90MM NO9

## (undated) DEVICE — GLV SURG SENSICARE W/ALOE PF LF 9 STRL

## (undated) DEVICE — INSUFFLATION NEEDLE TO ESTABLISH PNEUMOPERITONEUM.: Brand: INSUFFLATION NEEDLE

## (undated) DEVICE — HDRST POSTN SLOTTED A/

## (undated) DEVICE — IRRISEPT WOUND DEBRIDMENT AND CLEANSING SYSTEM: Brand: IRRISEPT

## (undated) DEVICE — SOL NACL 0.9PCT 1000ML

## (undated) DEVICE — HEAD HALTER: Brand: DEROYAL

## (undated) DEVICE — ADHS LIQ MASTISOL 2/3ML

## (undated) DEVICE — GOWN,PREVENTION PLUS,XLARGE,STERILE: Brand: MEDLINE

## (undated) DEVICE — 2000CC GUARDIAN II: Brand: GUARDIAN

## (undated) DEVICE — SKYLINE ANTERIOR CERVICAL PLATE SYSTEM DRILL 2.2 X 12MM: Brand: SKYLINE

## (undated) DEVICE — MAGNETIC DRAPE: Brand: DEVON

## (undated) DEVICE — PK SPINE ORTHO 10

## (undated) DEVICE — DISPOSABLE BIPOLAR FORCEPS 7 3/4" (19.7CM) SCOVILLE BAYONET, INSULATED, 1.5MM TIP AND 12 FT. (3.6M) CABLE: Brand: KIRWAN

## (undated) DEVICE — SNAP KOVER: Brand: UNBRANDED

## (undated) DEVICE — RICH GENERAL LAPAROSCOPY: Brand: MEDLINE INDUSTRIES, INC.

## (undated) DEVICE — INTENDED USE FOR SURGICAL MARKING ON INTACT SKIN, ALSO PROVIDES A PERMANENT METHOD OF IDENTIFYING OBJECTS IN THE OPERATING ROOM: Brand: WRITESITE® REGULAR TIP SKIN MARKER

## (undated) DEVICE — PAD ARMBRD SURG CONVOL 7.5X20X2IN

## (undated) DEVICE — LO CONTOUR COLLAR: Brand: DEROYAL

## (undated) DEVICE — ANTIBACTERIAL UNDYED BRAIDED (POLYGLACTIN 910), SYNTHETIC ABSORBABLE SUTURE: Brand: COATED VICRYL

## (undated) DEVICE — DIFFUSER: Brand: CORE, MAESTRO

## (undated) DEVICE — 3M™ STERI-STRIP™ REINFORCED ADHESIVE SKIN CLOSURES, R1547, 1/2 IN X 4 IN (12 MM X 100 MM), 6 STRIPS/ENVELOPE: Brand: 3M™ STERI-STRIP™

## (undated) DEVICE — GOWN,REINF,POLY,ECL,PP SLV,3XL,XLONG: Brand: MEDLINE

## (undated) DEVICE — ANCHOR TISSUE RETRIEVAL SYSTEM, BAG SIZE 125 ML, PORT SIZE 8 MM: Brand: ANCHOR TISSUE RETRIEVAL SYSTEM

## (undated) DEVICE — SYR LL TP 10ML STRL

## (undated) DEVICE — SYR SLPTP 30CC

## (undated) DEVICE — ST TBG PNEUMOCLEAR EVAC SMOKE HIFLO

## (undated) DEVICE — ELECTRD BLD EDGE/INSUL1P 2.4X5.1MM STRL

## (undated) DEVICE — OIL CARTRIDGE: Brand: CORE, MAESTRO

## (undated) DEVICE — 3M™ STERI-DRAPE™ INSTRUMENT POUCH 1018: Brand: STERI-DRAPE™

## (undated) DEVICE — 3M™ DURAPORE™ SURGICAL TAPE 1538-3, 3 INCH X 10 YARD (7,5CM X 9,1M), 4 ROLLS/BOX: Brand: 3M™ DURAPORE™

## (undated) DEVICE — SLV SCD CALF HEMOFORCE DVT THERP REPROC MD

## (undated) DEVICE — GAUZE,SPONGE,4"X4",16PLY,XRAY,STRL,LF: Brand: MEDLINE

## (undated) DEVICE — PATIENT RETURN ELECTRODE, SINGLE-USE, CONTACT QUALITY MONITORING, ADULT, WITH 9FT CORD, FOR PATIENTS WEIGING OVER 33LBS. (15KG): Brand: MEGADYNE

## (undated) DEVICE — 3.0MM PRECISION NEURO (MATCH HEAD)

## (undated) DEVICE — JACKSON-PRATT 100CC BULB RESERVOIR: Brand: CARDINAL HEALTH

## (undated) DEVICE — 2963 MEDIPORE SOFT CLOTH TAPE 3 IN X 10 YD 12 RLS/CS: Brand: 3M™ MEDIPORE™

## (undated) DEVICE — CANNULA SEAL

## (undated) DEVICE — DRAIN JACKSON PRATT 10FR 7MM: Brand: CARDINAL HEALTH

## (undated) DEVICE — GLV SURG SENSICARE W/ALOE PF LF 8.5 STRL

## (undated) DEVICE — MARKER,SKIN,WI/RULER AND LABELS: Brand: MEDLINE

## (undated) DEVICE — COLUMN DRAPE

## (undated) DEVICE — EXTENSION TAB: Brand: DEROYAL

## (undated) DEVICE — TB SXN FRAZIER 8F STRL

## (undated) DEVICE — SKYLINE ANTERIOR CERVICAL PLATE SYSTEM THREADED TEMPORARY FIX, PIN: Brand: SKYLINE

## (undated) DEVICE — SUT SILK 2/0 TIES 18IN A185H

## (undated) DEVICE — PENCL E/S HNDSWCH ROCKRBTN HOLSTR 10FT

## (undated) DEVICE — TAPE MICROFM 2IN LF

## (undated) DEVICE — BLADELESS OBTURATOR: Brand: WECK VISTA

## (undated) DEVICE — KITTNER SPONGE: Brand: DEROYAL

## (undated) DEVICE — SPNG GZ WOVN 4X4IN 12PLY 10/BX STRL

## (undated) DEVICE — CLNSR INST PREKLENZ SOAK/SHLD 6ML MD

## (undated) DEVICE — ARM DRAPE

## (undated) DEVICE — DRSNG TELFA PAD NONADH STR 1S 3X8IN